# Patient Record
Sex: FEMALE | Race: BLACK OR AFRICAN AMERICAN | NOT HISPANIC OR LATINO | Employment: FULL TIME | URBAN - METROPOLITAN AREA
[De-identification: names, ages, dates, MRNs, and addresses within clinical notes are randomized per-mention and may not be internally consistent; named-entity substitution may affect disease eponyms.]

---

## 2019-05-21 ENCOUNTER — HOSPITAL ENCOUNTER (EMERGENCY)
Facility: HOSPITAL | Age: 24
Discharge: HOME | End: 2019-05-21
Attending: EMERGENCY MEDICINE
Payer: MEDICAID

## 2019-05-21 VITALS
BODY MASS INDEX: 39.24 KG/M2 | TEMPERATURE: 99 F | RESPIRATION RATE: 18 BRPM | OXYGEN SATURATION: 99 % | SYSTOLIC BLOOD PRESSURE: 126 MMHG | HEIGHT: 67 IN | DIASTOLIC BLOOD PRESSURE: 64 MMHG | WEIGHT: 250 LBS | HEART RATE: 93 BPM

## 2019-05-21 DIAGNOSIS — D50.9 MICROCYTIC ANEMIA: Primary | ICD-10-CM

## 2019-05-21 DIAGNOSIS — R00.0 TACHYCARDIA: ICD-10-CM

## 2019-05-21 LAB
ALBUMIN SERPL BCP-MCNC: 3.8 G/DL (ref 3.5–5.2)
ALP SERPL-CCNC: 93 U/L (ref 55–135)
ALT SERPL W/O P-5'-P-CCNC: 14 U/L (ref 10–44)
ANION GAP SERPL CALC-SCNC: 8 MMOL/L (ref 8–16)
ANISOCYTOSIS BLD QL SMEAR: SLIGHT
AST SERPL-CCNC: 13 U/L (ref 10–40)
B-HCG UR QL: NEGATIVE
BASOPHILS # BLD AUTO: 0.01 K/UL (ref 0–0.2)
BASOPHILS NFR BLD: 0.2 % (ref 0–1.9)
BILIRUB SERPL-MCNC: 0.2 MG/DL (ref 0.1–1)
BILIRUB UR QL STRIP: NEGATIVE
BUN SERPL-MCNC: 8 MG/DL (ref 6–20)
CALCIUM SERPL-MCNC: 9.8 MG/DL (ref 8.7–10.5)
CHLORIDE SERPL-SCNC: 104 MMOL/L (ref 95–110)
CLARITY UR: CLEAR
CO2 SERPL-SCNC: 25 MMOL/L (ref 23–29)
COLOR UR: ABNORMAL
CREAT SERPL-MCNC: 0.7 MG/DL (ref 0.5–1.4)
CTP QC/QA: YES
DIFFERENTIAL METHOD: ABNORMAL
EOSINOPHIL # BLD AUTO: 0 K/UL (ref 0–0.5)
EOSINOPHIL NFR BLD: 0.6 % (ref 0–8)
ERYTHROCYTE [DISTWIDTH] IN BLOOD BY AUTOMATED COUNT: 18 % (ref 11.5–14.5)
EST. GFR  (AFRICAN AMERICAN): >60 ML/MIN/1.73 M^2
EST. GFR  (NON AFRICAN AMERICAN): >60 ML/MIN/1.73 M^2
GLUCOSE SERPL-MCNC: 92 MG/DL (ref 70–110)
GLUCOSE UR QL STRIP: NEGATIVE
HCT VFR BLD AUTO: 30.4 % (ref 37–48.5)
HGB BLD-MCNC: 9.3 G/DL (ref 12–16)
HGB UR QL STRIP: ABNORMAL
HYPOCHROMIA BLD QL SMEAR: ABNORMAL
KETONES UR QL STRIP: NEGATIVE
LEUKOCYTE ESTERASE UR QL STRIP: NEGATIVE
LIPASE SERPL-CCNC: 10 U/L (ref 4–60)
LYMPHOCYTES # BLD AUTO: 1.4 K/UL (ref 1–4.8)
LYMPHOCYTES NFR BLD: 21.3 % (ref 18–48)
MCH RBC QN AUTO: 21.3 PG (ref 27–31)
MCHC RBC AUTO-ENTMCNC: 30.6 G/DL (ref 32–36)
MCV RBC AUTO: 70 FL (ref 82–98)
MICROSCOPIC COMMENT: NORMAL
MONOCYTES # BLD AUTO: 0.4 K/UL (ref 0.3–1)
MONOCYTES NFR BLD: 6.2 % (ref 4–15)
NEUTROPHILS # BLD AUTO: 4.8 K/UL (ref 1.8–7.7)
NEUTROPHILS NFR BLD: 71.9 % (ref 38–73)
NITRITE UR QL STRIP: NEGATIVE
OVALOCYTES BLD QL SMEAR: ABNORMAL
PH UR STRIP: 6 [PH] (ref 5–8)
PLATELET # BLD AUTO: 456 K/UL (ref 150–350)
PMV BLD AUTO: 9.7 FL (ref 9.2–12.9)
POIKILOCYTOSIS BLD QL SMEAR: SLIGHT
POTASSIUM SERPL-SCNC: 3.8 MMOL/L (ref 3.5–5.1)
PROT SERPL-MCNC: 8.1 G/DL (ref 6–8.4)
PROT UR QL STRIP: NEGATIVE
RBC # BLD AUTO: 4.37 M/UL (ref 4–5.4)
RBC #/AREA URNS HPF: 1 /HPF (ref 0–4)
SODIUM SERPL-SCNC: 137 MMOL/L (ref 136–145)
SP GR UR STRIP: 1.01 (ref 1–1.03)
SQUAMOUS #/AREA URNS HPF: 3 /HPF
TARGETS BLD QL SMEAR: ABNORMAL
TSH SERPL DL<=0.005 MIU/L-ACNC: 0.71 UIU/ML (ref 0.4–4)
URN SPEC COLLECT METH UR: ABNORMAL
UROBILINOGEN UR STRIP-ACNC: NEGATIVE EU/DL
WBC # BLD AUTO: 6.63 K/UL (ref 3.9–12.7)
WBC #/AREA URNS HPF: 2 /HPF (ref 0–5)

## 2019-05-21 PROCEDURE — 93010 ELECTROCARDIOGRAM REPORT: CPT | Mod: ,,, | Performed by: INTERNAL MEDICINE

## 2019-05-21 PROCEDURE — 93010 EKG 12-LEAD: ICD-10-PCS | Mod: ,,, | Performed by: INTERNAL MEDICINE

## 2019-05-21 PROCEDURE — 85025 COMPLETE CBC W/AUTO DIFF WBC: CPT

## 2019-05-21 PROCEDURE — 25000003 PHARM REV CODE 250: Performed by: PHYSICIAN ASSISTANT

## 2019-05-21 PROCEDURE — 83690 ASSAY OF LIPASE: CPT

## 2019-05-21 PROCEDURE — 96374 THER/PROPH/DIAG INJ IV PUSH: CPT

## 2019-05-21 PROCEDURE — 80053 COMPREHEN METABOLIC PANEL: CPT

## 2019-05-21 PROCEDURE — 81000 URINALYSIS NONAUTO W/SCOPE: CPT

## 2019-05-21 PROCEDURE — 84443 ASSAY THYROID STIM HORMONE: CPT

## 2019-05-21 PROCEDURE — 96361 HYDRATE IV INFUSION ADD-ON: CPT

## 2019-05-21 PROCEDURE — 99284 EMERGENCY DEPT VISIT MOD MDM: CPT | Mod: 25

## 2019-05-21 PROCEDURE — 93005 ELECTROCARDIOGRAM TRACING: CPT

## 2019-05-21 PROCEDURE — 81025 URINE PREGNANCY TEST: CPT | Performed by: PHYSICIAN ASSISTANT

## 2019-05-21 PROCEDURE — 63600175 PHARM REV CODE 636 W HCPCS: Performed by: PHYSICIAN ASSISTANT

## 2019-05-21 RX ORDER — ONDANSETRON 2 MG/ML
4 INJECTION INTRAMUSCULAR; INTRAVENOUS
Status: COMPLETED | OUTPATIENT
Start: 2019-05-21 | End: 2019-05-21

## 2019-05-21 RX ORDER — ONDANSETRON 4 MG/1
4 TABLET, FILM COATED ORAL EVERY 8 HOURS PRN
Qty: 12 TABLET | Refills: 0 | Status: ON HOLD | OUTPATIENT
Start: 2019-05-21 | End: 2020-02-14 | Stop reason: HOSPADM

## 2019-05-21 RX ADMIN — ONDANSETRON 4 MG: 2 INJECTION INTRAMUSCULAR; INTRAVENOUS at 01:05

## 2019-05-21 RX ADMIN — SODIUM CHLORIDE 1000 ML: 0.9 INJECTION, SOLUTION INTRAVENOUS at 01:05

## 2019-05-21 NOTE — ED TRIAGE NOTES
Arrived via personal transportation. Pt reports light headiness, nausea, and fatigue x 1 week. Denies vomiting.

## 2019-05-21 NOTE — ED PROVIDER NOTES
Encounter Date: 5/21/2019    SCRIBE #1 NOTE: I, Basia Hillman, am scribing for, and in the presence of,  Williamericka Estes. I have scribed the entire note.       History     Chief Complaint   Patient presents with    Fatigue     states has been feeling nauseous , lightheaded and tired x 1 week. Denies pain     CC: Lightheadedness    HPI:  This is a 23 y.o. female smoker (1 cigarette per day) with a PMHx of HTN, abdominal ulcer, and acid reflux who presents to the Emergency Department with a cc of lightheadedness x 1 week. The lightheadedness is described as a feeling of being off-balance that comes and goes. Associated symptoms include headache, nausea, and vomiting. She denies vision changes, abdominal pain, diarrhea, hematuria, dysuria, frequency, vaginal discharge, vaginal bleeding, fever, chills, cough, constipation, rhinorrhea, sore throat, chest pain, shortness of breath, or appetite change. Her symptoms are more severe in the morning. Symptoms are worsened by lying supine and without alleviating factors. She reports no prior history of similar symptoms. NKDA. She has not seen an eye doctor in 3-4 years. LMP began 4/17/2019 ended 4 days earlier than usual. THC occasionally, but no recent use.        The history is provided by the patient.     Review of patient's allergies indicates:  No Known Allergies  Past Medical History:   Diagnosis Date    ADD (attention deficit disorder)     Hypertension      Past Surgical History:   Procedure Laterality Date    BREAST SURGERY      left (biopsy)    EXCISION-CYST-BREAST Left 7/30/2014    Performed by Anival Yao MD at [facility] OR     History reviewed. No pertinent family history.  Social History     Tobacco Use    Smoking status: Current Every Day Smoker   Substance Use Topics    Alcohol use: Yes     Comment: occasionally    Drug use: No     Review of Systems   Constitutional: Negative for appetite change, chills and fever.   HENT: Negative for rhinorrhea and sore  throat.    Eyes: Negative for visual disturbance.   Respiratory: Negative for cough and shortness of breath.    Cardiovascular: Negative for chest pain.   Gastrointestinal: Positive for nausea and vomiting. Negative for abdominal pain, constipation and diarrhea.   Genitourinary: Negative for dysuria, frequency, hematuria, vaginal bleeding and vaginal discharge.   Musculoskeletal: Negative for back pain.   Skin: Negative for rash.   Neurological: Positive for light-headedness and headaches. Negative for weakness.   Hematological: Does not bruise/bleed easily.   All other systems reviewed and are negative.      Physical Exam     Initial Vitals [05/21/19 1202]   BP Pulse Resp Temp SpO2   (!) 163/99 (!) 112 16 98.6 °F (37 °C) 99 %      MAP       --         Physical Exam    Nursing note and vitals reviewed.  Constitutional: She appears well-developed and well-nourished. No distress.   HENT:   Head: Normocephalic and atraumatic.   Eyes: EOM are normal. Pupils are equal, round, and reactive to light.   Neck: Normal range of motion. Neck supple.   Cardiovascular: Normal rate, regular rhythm and normal heart sounds. Exam reveals no gallop and no friction rub.    No murmur heard.  Pulmonary/Chest: Breath sounds normal. No respiratory distress. She has no wheezes. She has no rhonchi. She has no rales.   Abdominal: Soft. Bowel sounds are normal. There is no tenderness. There is no rebound and no guarding.   Musculoskeletal: Normal range of motion.   Neurological: She is alert and oriented to person, place, and time. She has normal strength. No cranial nerve deficit or sensory deficit. She displays a negative Romberg sign. Coordination and gait normal. GCS eye subscore is 4. GCS verbal subscore is 5. GCS motor subscore is 6.   Normal finger-nose.  Normal rapid alternating movements.  Normal heel-to-shin.   Skin: Skin is warm and dry.   Psychiatric: She has a normal mood and affect.         ED Course   Procedures  Labs Reviewed    CBC W/ AUTO DIFFERENTIAL - Abnormal; Notable for the following components:       Result Value    Hemoglobin 9.3 (*)     Hematocrit 30.4 (*)     Mean Corpuscular Volume 70 (*)     Mean Corpuscular Hemoglobin 21.3 (*)     Mean Corpuscular Hemoglobin Conc 30.6 (*)     RDW 18.0 (*)     Platelets 456 (*)     All other components within normal limits   URINALYSIS, REFLEX TO URINE CULTURE - Abnormal; Notable for the following components:    Occult Blood UA 3+ (*)     All other components within normal limits    Narrative:     Preferred Collection Type->Urine, Clean Catch   COMPREHENSIVE METABOLIC PANEL   LIPASE   TSH   URINALYSIS MICROSCOPIC    Narrative:     Preferred Collection Type->Urine, Clean Catch   POCT URINE PREGNANCY     EKG Readings: (Independently Interpreted)   Rhythm: Sinus Tachycardia. Heart Rate: 105. ST Segments: Normal ST Segments. T Waves: Normal.       Imaging Results    None          Medical Decision Making:   Differential Diagnosis:   Differential diagnosis includes but is not limited to:  Dehydration, anemia, cardiac arrhythmia, hypothyroidism, electrolyte derangement, vertigo, pregnancy, UTI  ED Management:  This is an evaluation of a 23 y.o. female who presents to the ED for lightheadedness and nausea.  Patient is tachycardic.  Vital signs otherwise stable. Afebrile.  Patient is nontoxic appearing and in no acute distress. Patient is urologically intact. No gross neurological deficits.  Patient is tachycardic but heart sounds otherwise normal.  Lungs are clear to auscultation. Posterior oropharynx is clear.  Extraocular moves are intact.  No nystagmus.  Bilateral TMs are clear.  Abdomen is soft and nontender palpation.    UPT negative. CBC shows a microcytic anemia but H&H is stable. No leukocytosis.  CMP shows no gross electrolyte abnormalities.  Lipase within limits.  TSH within limits.  UA shows no signs of infection.  EKG shows sinus tachycardia with a rate of 105 with no ST segment changes.   Patient treated the ED with IV fluids and Zofran with improvement of symptoms. Given patient's recent menstrual cycle, I suspect etiology of patient's symptoms as well as microcytic anemia likely secondary to heavy menstruation.  Will encourage patient follow up with OBGYN.  Heart rate improved to 93 after treatment emergency department.    Patient given return precautions and instructed to return to the emergency department for any new or worsening symptoms. Patient verbalized understanding and agreed with plan.     I discussed the case with Dr. Iyer  who is in agreement with my assessment and plan.                        Clinical Impression:     1. Microcytic anemia    2. Tachycardia               Scribe attestation: I, William Estes PA-C, personally performed the services described in this documentation. All medical record entries made by the scribe were at my direction and in my presence.  I have reviewed the chart and agree that the record reflects my personal performance and is accurate and complete.                      William Estes PA-C  05/21/19 [id number]       William Estes PA-C  05/21/19 [id number]

## 2020-02-06 ENCOUNTER — HOSPITAL ENCOUNTER (EMERGENCY)
Facility: HOSPITAL | Age: 25
Discharge: PSYCHIATRIC OR SUBSTANCE USE FACILITY | End: 2020-02-06
Attending: EMERGENCY MEDICINE
Payer: MEDICAID

## 2020-02-06 VITALS
OXYGEN SATURATION: 100 % | HEART RATE: 110 BPM | DIASTOLIC BLOOD PRESSURE: 87 MMHG | TEMPERATURE: 98 F | BODY MASS INDEX: 39.24 KG/M2 | RESPIRATION RATE: 19 BRPM | SYSTOLIC BLOOD PRESSURE: 129 MMHG | HEIGHT: 67 IN | WEIGHT: 250 LBS

## 2020-02-06 DIAGNOSIS — R45.851 SUICIDAL IDEATION: ICD-10-CM

## 2020-02-06 DIAGNOSIS — F32.A DEPRESSION WITH SUICIDAL IDEATION: Primary | ICD-10-CM

## 2020-02-06 DIAGNOSIS — Z00.8 MEDICAL CLEARANCE FOR PSYCHIATRIC ADMISSION: ICD-10-CM

## 2020-02-06 DIAGNOSIS — R45.851 DEPRESSION WITH SUICIDAL IDEATION: Primary | ICD-10-CM

## 2020-02-06 DIAGNOSIS — R44.0 AUDITORY HALLUCINATIONS: ICD-10-CM

## 2020-02-06 PROBLEM — Z13.9 ENCOUNTER FOR MEDICAL SCREENING EXAMINATION: Status: ACTIVE | Noted: 2020-02-06

## 2020-02-06 PROBLEM — D50.9 IRON DEFICIENCY ANEMIA: Status: ACTIVE | Noted: 2020-02-06

## 2020-02-06 PROBLEM — I10 HTN (HYPERTENSION): Status: ACTIVE | Noted: 2020-02-06

## 2020-02-06 LAB
ALBUMIN SERPL BCP-MCNC: 3.9 G/DL (ref 3.5–5.2)
ALP SERPL-CCNC: 102 U/L (ref 55–135)
ALT SERPL W/O P-5'-P-CCNC: 39 U/L (ref 10–44)
AMPHET+METHAMPHET UR QL: NEGATIVE
ANION GAP SERPL CALC-SCNC: 11 MMOL/L (ref 8–16)
APAP SERPL-MCNC: <3 UG/ML (ref 10–20)
AST SERPL-CCNC: 27 U/L (ref 10–40)
B-HCG UR QL: NEGATIVE
BACTERIA #/AREA URNS HPF: ABNORMAL /HPF
BARBITURATES UR QL SCN>200 NG/ML: NEGATIVE
BASOPHILS # BLD AUTO: 0.02 K/UL (ref 0–0.2)
BASOPHILS NFR BLD: 0.3 % (ref 0–1.9)
BENZODIAZ UR QL SCN>200 NG/ML: NEGATIVE
BILIRUB SERPL-MCNC: 0.1 MG/DL (ref 0.1–1)
BILIRUB UR QL STRIP: NEGATIVE
BUN SERPL-MCNC: 12 MG/DL (ref 6–20)
BZE UR QL SCN: NEGATIVE
CALCIUM SERPL-MCNC: 9.6 MG/DL (ref 8.7–10.5)
CANNABINOIDS UR QL SCN: NEGATIVE
CHLORIDE SERPL-SCNC: 109 MMOL/L (ref 95–110)
CLARITY UR: CLEAR
CO2 SERPL-SCNC: 20 MMOL/L (ref 23–29)
COLOR UR: YELLOW
CREAT SERPL-MCNC: 0.8 MG/DL (ref 0.5–1.4)
CREAT UR-MCNC: 147.7 MG/DL (ref 15–325)
CTP QC/QA: YES
DIFFERENTIAL METHOD: ABNORMAL
EOSINOPHIL # BLD AUTO: 0.2 K/UL (ref 0–0.5)
EOSINOPHIL NFR BLD: 2 % (ref 0–8)
ERYTHROCYTE [DISTWIDTH] IN BLOOD BY AUTOMATED COUNT: 19.2 % (ref 11.5–14.5)
EST. GFR  (AFRICAN AMERICAN): >60 ML/MIN/1.73 M^2
EST. GFR  (NON AFRICAN AMERICAN): >60 ML/MIN/1.73 M^2
ETHANOL SERPL-MCNC: 88 MG/DL
GLUCOSE SERPL-MCNC: 100 MG/DL (ref 70–110)
GLUCOSE UR QL STRIP: NEGATIVE
HCT VFR BLD AUTO: 35.2 % (ref 37–48.5)
HGB BLD-MCNC: 10.5 G/DL (ref 12–16)
HGB UR QL STRIP: NEGATIVE
HYALINE CASTS #/AREA URNS LPF: 0 /LPF
IMM GRANULOCYTES # BLD AUTO: 0.01 K/UL (ref 0–0.04)
IMM GRANULOCYTES NFR BLD AUTO: 0.1 % (ref 0–0.5)
KETONES UR QL STRIP: NEGATIVE
LEUKOCYTE ESTERASE UR QL STRIP: NEGATIVE
LYMPHOCYTES # BLD AUTO: 1.8 K/UL (ref 1–4.8)
LYMPHOCYTES NFR BLD: 23.4 % (ref 18–48)
MCH RBC QN AUTO: 21.7 PG (ref 27–31)
MCHC RBC AUTO-ENTMCNC: 29.8 G/DL (ref 32–36)
MCV RBC AUTO: 73 FL (ref 82–98)
METHADONE UR QL SCN>300 NG/ML: NEGATIVE
MICROSCOPIC COMMENT: ABNORMAL
MONOCYTES # BLD AUTO: 0.5 K/UL (ref 0.3–1)
MONOCYTES NFR BLD: 6.4 % (ref 4–15)
NEUTROPHILS # BLD AUTO: 5.2 K/UL (ref 1.8–7.7)
NEUTROPHILS NFR BLD: 67.8 % (ref 38–73)
NITRITE UR QL STRIP: NEGATIVE
NRBC BLD-RTO: 0 /100 WBC
OPIATES UR QL SCN: NEGATIVE
PCP UR QL SCN>25 NG/ML: NEGATIVE
PH UR STRIP: 6 [PH] (ref 5–8)
PLATELET # BLD AUTO: 449 K/UL (ref 150–350)
PMV BLD AUTO: 10.4 FL (ref 9.2–12.9)
POTASSIUM SERPL-SCNC: 4.2 MMOL/L (ref 3.5–5.1)
PROT SERPL-MCNC: 7.6 G/DL (ref 6–8.4)
PROT UR QL STRIP: ABNORMAL
RBC # BLD AUTO: 4.83 M/UL (ref 4–5.4)
RBC #/AREA URNS HPF: 2 /HPF (ref 0–4)
SALICYLATES SERPL-MCNC: <5 MG/DL (ref 15–30)
SODIUM SERPL-SCNC: 140 MMOL/L (ref 136–145)
SP GR UR STRIP: 1.02 (ref 1–1.03)
TOXICOLOGY INFORMATION: NORMAL
TSH SERPL DL<=0.005 MIU/L-ACNC: 0.76 UIU/ML (ref 0.4–4)
URN SPEC COLLECT METH UR: ABNORMAL
UROBILINOGEN UR STRIP-ACNC: NEGATIVE EU/DL
WBC # BLD AUTO: 7.66 K/UL (ref 3.9–12.7)
WBC #/AREA URNS HPF: 8 /HPF (ref 0–5)

## 2020-02-06 PROCEDURE — 81000 URINALYSIS NONAUTO W/SCOPE: CPT | Mod: 59

## 2020-02-06 PROCEDURE — 80329 ANALGESICS NON-OPIOID 1 OR 2: CPT

## 2020-02-06 PROCEDURE — 81025 URINE PREGNANCY TEST: CPT | Performed by: EMERGENCY MEDICINE

## 2020-02-06 PROCEDURE — 85025 COMPLETE CBC W/AUTO DIFF WBC: CPT

## 2020-02-06 PROCEDURE — 80053 COMPREHEN METABOLIC PANEL: CPT

## 2020-02-06 PROCEDURE — 99285 EMERGENCY DEPT VISIT HI MDM: CPT | Mod: 25

## 2020-02-06 PROCEDURE — 84443 ASSAY THYROID STIM HORMONE: CPT

## 2020-02-06 PROCEDURE — 80307 DRUG TEST PRSMV CHEM ANLYZR: CPT

## 2020-02-06 PROCEDURE — 93010 ELECTROCARDIOGRAM REPORT: CPT | Mod: ,,, | Performed by: INTERNAL MEDICINE

## 2020-02-06 PROCEDURE — 93005 ELECTROCARDIOGRAM TRACING: CPT

## 2020-02-06 PROCEDURE — 80320 DRUG SCREEN QUANTALCOHOLS: CPT

## 2020-02-06 PROCEDURE — 25000003 PHARM REV CODE 250: Performed by: EMERGENCY MEDICINE

## 2020-02-06 PROCEDURE — 93010 EKG 12-LEAD: ICD-10-PCS | Mod: ,,, | Performed by: INTERNAL MEDICINE

## 2020-02-06 RX ORDER — LORAZEPAM 0.5 MG/1
2 TABLET ORAL
Status: COMPLETED | OUTPATIENT
Start: 2020-02-06 | End: 2020-02-06

## 2020-02-06 RX ADMIN — LORAZEPAM 2 MG: 0.5 TABLET ORAL at 03:02

## 2020-02-06 NOTE — ED TRIAGE NOTES
Patient reports having suicidal thoughts her entire adult life. Most recent episode started over a month ago.  Patient reports coping with issues by drinking.  Patient reports hearing voices, and having anxiety.

## 2020-05-11 PROBLEM — Z13.9 ENCOUNTER FOR MEDICAL SCREENING EXAMINATION: Status: RESOLVED | Noted: 2020-02-06 | Resolved: 2020-05-11

## 2020-10-19 ENCOUNTER — HOSPITAL ENCOUNTER (EMERGENCY)
Facility: HOSPITAL | Age: 25
Discharge: HOME | End: 2020-10-19
Attending: EMERGENCY MEDICINE

## 2020-10-19 VITALS
HEART RATE: 95 BPM | RESPIRATION RATE: 18 BRPM | SYSTOLIC BLOOD PRESSURE: 145 MMHG | OXYGEN SATURATION: 100 % | BODY MASS INDEX: 43.95 KG/M2 | TEMPERATURE: 99 F | HEIGHT: 67 IN | DIASTOLIC BLOOD PRESSURE: 90 MMHG | WEIGHT: 280 LBS

## 2020-10-19 DIAGNOSIS — R11.2 NON-INTRACTABLE VOMITING WITH NAUSEA, UNSPECIFIED VOMITING TYPE: Primary | ICD-10-CM

## 2020-10-19 LAB
ALBUMIN SERPL BCP-MCNC: 3.3 G/DL (ref 3.5–5.2)
ALP SERPL-CCNC: 84 U/L (ref 55–135)
ALT SERPL W/O P-5'-P-CCNC: 24 U/L (ref 10–44)
ANION GAP SERPL CALC-SCNC: 10 MMOL/L (ref 8–16)
AST SERPL-CCNC: 17 U/L (ref 10–40)
B-HCG UR QL: NEGATIVE
BASOPHILS # BLD AUTO: 0.04 K/UL (ref 0–0.2)
BASOPHILS NFR BLD: 0.5 % (ref 0–1.9)
BILIRUB SERPL-MCNC: 0.1 MG/DL (ref 0.1–1)
BILIRUB UR QL STRIP: NEGATIVE
BUN SERPL-MCNC: 11 MG/DL (ref 6–20)
CALCIUM SERPL-MCNC: 9.1 MG/DL (ref 8.7–10.5)
CHLORIDE SERPL-SCNC: 105 MMOL/L (ref 95–110)
CLARITY UR: CLEAR
CO2 SERPL-SCNC: 25 MMOL/L (ref 23–29)
COLOR UR: YELLOW
CREAT SERPL-MCNC: 0.8 MG/DL (ref 0.5–1.4)
CTP QC/QA: YES
DIFFERENTIAL METHOD: ABNORMAL
EOSINOPHIL # BLD AUTO: 0.2 K/UL (ref 0–0.5)
EOSINOPHIL NFR BLD: 2.1 % (ref 0–8)
ERYTHROCYTE [DISTWIDTH] IN BLOOD BY AUTOMATED COUNT: 17.8 % (ref 11.5–14.5)
EST. GFR  (AFRICAN AMERICAN): >60 ML/MIN/1.73 M^2
EST. GFR  (NON AFRICAN AMERICAN): >60 ML/MIN/1.73 M^2
GLUCOSE SERPL-MCNC: 109 MG/DL (ref 70–110)
GLUCOSE UR QL STRIP: NEGATIVE
HCT VFR BLD AUTO: 31.3 % (ref 37–48.5)
HGB BLD-MCNC: 9.3 G/DL (ref 12–16)
HGB UR QL STRIP: NEGATIVE
IMM GRANULOCYTES # BLD AUTO: 0.02 K/UL (ref 0–0.04)
IMM GRANULOCYTES NFR BLD AUTO: 0.2 % (ref 0–0.5)
KETONES UR QL STRIP: NEGATIVE
LEUKOCYTE ESTERASE UR QL STRIP: NEGATIVE
LIPASE SERPL-CCNC: 26 U/L (ref 4–60)
LYMPHOCYTES # BLD AUTO: 2.3 K/UL (ref 1–4.8)
LYMPHOCYTES NFR BLD: 27 % (ref 18–48)
MCH RBC QN AUTO: 21.3 PG (ref 27–31)
MCHC RBC AUTO-ENTMCNC: 29.7 G/DL (ref 32–36)
MCV RBC AUTO: 72 FL (ref 82–98)
MONOCYTES # BLD AUTO: 0.6 K/UL (ref 0.3–1)
MONOCYTES NFR BLD: 6.9 % (ref 4–15)
NEUTROPHILS # BLD AUTO: 5.3 K/UL (ref 1.8–7.7)
NEUTROPHILS NFR BLD: 63.3 % (ref 38–73)
NITRITE UR QL STRIP: NEGATIVE
NRBC BLD-RTO: 0 /100 WBC
PH UR STRIP: 6 [PH] (ref 5–8)
PLATELET # BLD AUTO: 445 K/UL (ref 150–350)
PMV BLD AUTO: 10.3 FL (ref 9.2–12.9)
POTASSIUM SERPL-SCNC: 3.6 MMOL/L (ref 3.5–5.1)
PROT SERPL-MCNC: 7.4 G/DL (ref 6–8.4)
PROT UR QL STRIP: NEGATIVE
RBC # BLD AUTO: 4.37 M/UL (ref 4–5.4)
SODIUM SERPL-SCNC: 140 MMOL/L (ref 136–145)
SP GR UR STRIP: 1.02 (ref 1–1.03)
URN SPEC COLLECT METH UR: NORMAL
UROBILINOGEN UR STRIP-ACNC: NEGATIVE EU/DL
WBC # BLD AUTO: 8.44 K/UL (ref 3.9–12.7)

## 2020-10-19 PROCEDURE — 83690 ASSAY OF LIPASE: CPT

## 2020-10-19 PROCEDURE — 96361 HYDRATE IV INFUSION ADD-ON: CPT

## 2020-10-19 PROCEDURE — 81025 URINE PREGNANCY TEST: CPT | Performed by: EMERGENCY MEDICINE

## 2020-10-19 PROCEDURE — 80053 COMPREHEN METABOLIC PANEL: CPT

## 2020-10-19 PROCEDURE — 25000003 PHARM REV CODE 250: Performed by: PHYSICIAN ASSISTANT

## 2020-10-19 PROCEDURE — 85025 COMPLETE CBC W/AUTO DIFF WBC: CPT

## 2020-10-19 PROCEDURE — 96374 THER/PROPH/DIAG INJ IV PUSH: CPT

## 2020-10-19 PROCEDURE — 63600175 PHARM REV CODE 636 W HCPCS: Performed by: PHYSICIAN ASSISTANT

## 2020-10-19 PROCEDURE — 99284 EMERGENCY DEPT VISIT MOD MDM: CPT | Mod: 25

## 2020-10-19 PROCEDURE — 81003 URINALYSIS AUTO W/O SCOPE: CPT

## 2020-10-19 RX ORDER — ONDANSETRON 2 MG/ML
4 INJECTION INTRAMUSCULAR; INTRAVENOUS
Status: COMPLETED | OUTPATIENT
Start: 2020-10-19 | End: 2020-10-19

## 2020-10-19 RX ORDER — PANTOPRAZOLE SODIUM 20 MG/1
20 TABLET, DELAYED RELEASE ORAL DAILY
Qty: 30 TABLET | Refills: 0 | Status: SHIPPED | OUTPATIENT
Start: 2020-10-19 | End: 2021-10-19

## 2020-10-19 RX ORDER — ONDANSETRON 4 MG/1
4 TABLET, ORALLY DISINTEGRATING ORAL EVERY 6 HOURS PRN
Qty: 20 TABLET | Refills: 0 | Status: SHIPPED | OUTPATIENT
Start: 2020-10-19

## 2020-10-19 RX ADMIN — SODIUM CHLORIDE 1000 ML: 0.9 INJECTION, SOLUTION INTRAVENOUS at 09:10

## 2020-10-19 RX ADMIN — ONDANSETRON 4 MG: 2 INJECTION INTRAMUSCULAR; INTRAVENOUS at 09:10

## 2020-10-19 NOTE — Clinical Note
"Yvonne Fermin" Dennis was seen and treated in our emergency department on 10/19/2020.  She may return to work on 10/21/2020.       If you have any questions or concerns, please don't hesitate to call.      Eduin Raygoza PA-C"

## 2020-10-20 NOTE — FIRST PROVIDER EVALUATION
Emergency Department TeleTriage Encounter Note      CHIEF COMPLAINT    Chief Complaint   Patient presents with    Nausea     w/ dizziness x 2 months, has had this before       VITAL SIGNS   Initial Vitals [10/19/20 2003]   BP Pulse Resp Temp SpO2   (!) 144/92 (!) 119 18 98.4 °F (36.9 °C) 98 %      MAP       --            ALLERGIES    Review of patient's allergies indicates:  No Known Allergies    PROVIDER TRIAGE NOTE  This is a teletriage evaluation of a 24 y.o. female presenting to the ED with c/o regular nausea and vomiting with associated dizziness frequently over the last 2 months.  She also reports headache today.  Denies abdominal pain.  UPT negative. Initial orders will be placed and care will be transferred to an alternate provider when patient is roomed for a full evaluation. Any additional orders and the final disposition will be determined by that provider.         ORDERS  Labs Reviewed   CBC W/ AUTO DIFFERENTIAL   COMPREHENSIVE METABOLIC PANEL   LIPASE   URINALYSIS, REFLEX TO URINE CULTURE   POCT URINE PREGNANCY       ED Orders (720h ago, onward)    Start Ordered     Status Ordering Provider    10/19/20 2045 10/19/20 2040  sodium chloride 0.9% bolus 1,000 mL  ED 1 Time      Ordered OJ SERRANO    10/19/20 2045 10/19/20 2040  ondansetron injection 4 mg  ED 1 Time      Ordered OJ SERRANO    10/19/20 2041 10/19/20 2040  Lipase  STAT  Collect    Ordered OJ SERRANO    10/19/20 2041 10/19/20 2040  Urinalysis, Reflex to Urine Culture Urine, Clean Catch  STAT      Ordered OJ SERRANO    10/19/20 2040 10/19/20 2040  Insert peripheral IV  Continuous      Ordered OJ SERRANO    10/19/20 2040 10/19/20 2040  CBC auto differential  STAT  Collect    Ordered OJ SERRANO    10/19/20 2040 10/19/20 2040  Comprehensive metabolic panel  STAT  Collect    Ordered OJ SERRANO    10/19/20 2006 10/19/20 2005  POCT urine pregnancy  Once  Completed    Final result ARNEL PRESLEY            Virtual Visit  Note: The provider triage portion of this emergency department evaluation and documentation was performed via [employer], a HIPAA-compliant telemedicine application, in concert with a tele-presenter in the room. A face to face patient evaluation with one of my colleagues will occur once the patient is placed in an emergency department room.      DISCLAIMER: This note was prepared with [employer] voice recognition transcription software. Garbled syntax, mangled pronouns, and other bizarre constructions may be attributed to that software system.

## 2020-10-20 NOTE — ED PROVIDER NOTES
Encounter Date: 10/19/2020       History     Chief Complaint   Patient presents with    Nausea     w/ dizziness x 2 months, has had this before     24-year-old female, history of costochondritis, dysphagia, epistaxis, dizziness, hypertension, ADHD, subjective history of PUD, anemia, presents to ED complaining of 2 month history of nausea, emesis, lightheadedness.     Patient states she has a history of peptic ulcer disease; states she was diagnosed due to nausea vomiting particularly in the mornings.  She states she was treated many years ago, denies history of H pylori.  She states over the past 2 months she has nauseous when she wakes up, sometime she throws up.  Denies hematemesis, no bilious emesis.  No history of gallbladder issues.  No history of any abdominal surgeries.  She denies any urinary complaints or flank pain.  She does state that she has had loose stools 2-3 times daily over the past 2 months, sometimes with mucoid stool.  No hematochezia or melena.  Denies NSAID use.  Denies history of GI bleed. Lightheadedness intermittent x 2 months. No exertional lightheadedness, typically occurs in AM with severe nausea. No syncope or near syncope. No headache. No arrhythmia. No CP or SOB.    She admits to normal appetite intake, states nausea improves throughout the day.  No fever or chills or body aches.  No recent illness or sick contacts.  No abdominal pain.  No pelvic pain.  No vaginal complaints.        Review of patient's allergies indicates:  No Known Allergies  Past Medical History:   Diagnosis Date    ADD (attention deficit disorder)     Hypertension      Past Surgical History:   Procedure Laterality Date    BREAST SURGERY      left (biopsy)     History reviewed. No pertinent family history.  Social History     Tobacco Use    Smoking status: Current Every Day Smoker     Packs/day: 0.25     Types: Cigarettes   Substance Use Topics    Alcohol use: Yes     Comment: occasionally    Drug use: No      Review of Systems   Constitutional: Positive for appetite change. Negative for chills, fatigue and fever.   Respiratory: Negative for shortness of breath.    Cardiovascular: Negative for chest pain.   Gastrointestinal: Positive for diarrhea, nausea and vomiting. Negative for abdominal distention, abdominal pain, blood in stool and constipation.   Genitourinary: Negative for dysuria, flank pain and frequency.   Musculoskeletal: Negative for back pain, myalgias, neck pain and neck stiffness.   Skin: Negative for rash.   Neurological: Negative for weakness and light-headedness.       Physical Exam     Initial Vitals [10/19/20 2003]   BP Pulse Resp Temp SpO2   (!) 144/92 (!) 119 18 98.4 °F (36.9 °C) 98 %      MAP       --         Physical Exam    Nursing note and vitals reviewed.  Constitutional: She appears well-developed and well-nourished. She is not diaphoretic. No distress.   HENT:   Head: Normocephalic and atraumatic.   Mouth/Throat: Oropharynx is clear and moist.   Eyes: EOM are normal.   Neck: Neck supple.   Cardiovascular: Intact distal pulses.   Pulmonary/Chest: No respiratory distress.   Abdominal: Soft. Bowel sounds are normal. She exhibits no distension. There is no abdominal tenderness.   Musculoskeletal: Normal range of motion.   Neurological: She is alert and oriented to person, place, and time.   Skin: Skin is warm.   Psychiatric: She has a normal mood and affect. Thought content normal.         ED Course   Procedures  Labs Reviewed   CBC W/ AUTO DIFFERENTIAL - Abnormal; Notable for the following components:       Result Value    Hemoglobin 9.3 (*)     Hematocrit 31.3 (*)     Mean Corpuscular Volume 72 (*)     Mean Corpuscular Hemoglobin 21.3 (*)     Mean Corpuscular Hemoglobin Conc 29.7 (*)     RDW 17.8 (*)     Platelets 445 (*)     All other components within normal limits   COMPREHENSIVE METABOLIC PANEL - Abnormal; Notable for the following components:    Albumin 3.3 (*)     All other components  within normal limits   LIPASE   URINALYSIS, REFLEX TO URINE CULTURE    Narrative:     Specimen Source->Urine   POCT URINE PREGNANCY          Imaging Results    None          Medical Decision Making:   Initial Assessment:   22-year-old female with 2 month history of nausea in the mornings, sometimes with emesis, also with loose stools over the past 2 months.  No abdominal pain.  No fever.  No change in appetite or intake.  No bloody stools.  Differential Diagnosis:   Inflammatory bowel disease, enteritis, colitis, H pylori, peptic ulcer disease, GERD, gastritis  Clinical Tests:   Lab Tests: Ordered and Reviewed  ED Management:  Lab work unremarkable.  H&H similar previous.  History of anemia.  No complaints concerning for active bleeding at this time.     Symptoms are persistent x2 months.  Vitals similar to previous visits.  Exam overall unremarkable. Young and otherwise healthy. Low suspicion for emergent process. No peritoneal signs. Low suspicion for surgical process.                             Clinical Impression:     ICD-10-CM ICD-9-CM   1. Non-intractable vomiting with nausea, unspecified vomiting type  R11.2 787.01                      Disposition:   Disposition: Discharged  Condition: Stable     ED Disposition Condition    Discharge Stable        ED Prescriptions     Medication Sig Dispense Start Date End Date Auth. Provider    ondansetron (ZOFRAN-ODT) 4 MG TbDL Take 1 tablet (4 mg total) by mouth every 6 (six) hours as needed (nausea). 20 tablet 10/19/2020  Eduin Raygoza PA-C    pantoprazole (PROTONIX) 20 MG tablet Take 1 tablet (20 mg total) by mouth once daily. 30 tablet 10/19/2020 10/19/2021 Eduin Raygoza PA-C        Follow-up Information     Follow up With Specialties Details Why Contact Info    Eleuterio Osorio MD Gastroenterology Schedule an appointment as soon as possible for a visit  For reevaluation [address] [facility] [address]  [phone]      Rigoberto Johnson MD Gastroenterology  Schedule an appointment as soon as possible for a visit  For reevaluation [address] [place] [address]  [address]  [facility]  [place] [place] [address]  [phone]      [facility] Gastroenterology Schedule an appointment as soon as possible for a visit  For reevaluation [address] [facility] [address]  [phone]                                         Eduin Raygoza PA-C  10/20/20 0356

## 2020-10-20 NOTE — ED TRIAGE NOTES
Patient reports nausea, vomiting, diarrhea abdominal pain/cramping upon waking x 2 months, worse today. Denies fever, dysuria, vaginal bleeding, discharge. No meds taken PTA. Also reports frontal headache that started on today. Denies blurred vision, dizziness.

## 2020-10-20 NOTE — DISCHARGE INSTRUCTIONS
Zofran as needed for nausea. Protonix daily.    Follow-up with Gastroenterology for reevaluation. Please return if you are no longer eating or drinking, if uncontrolled vomiting, if worsening abdominal pain, if you begin with black or bloody stools, if any other problems occur.

## 2021-07-13 NOTE — ED PROVIDER NOTES
"Encounter Date: 2/6/2020       History     Chief Complaint   Patient presents with    Suicidal     pt states she is having SI x over a month; hx of SI in the past; denies meds at home; hx of anxiety; pt is anxious in triage and tearful; denies plans and states she drinks a lot; cooperative in triage     23yo female with no relevant PMH presents via personal transportation with depression and SI. Patient reports depression throughout her adolescence, starting when she was a teenager. She has never seen a mental health professional. Patient has done self-harming behaviors in the past; she has cut herself in the past, and recently has banged her head against the wall. Patient also admits to poor sleep and poor eating. She also notes that she hears voices telling her she is worthless. No visual hallucinations.     Patient lives with her boyfriend, whom she has been with since high school. "He doesn't think it's serious." Patient states her boyfriend thinks her behaviors are to gain attention from him.    Patient graduated from high school here. She moved up to [place] for 3 years to live with her ailing grandmother. Patient moved back down here around a year ago. She works at [employer] but has no friends there because "people are mean to me." Patient's friends got into an argument with her tonight.    Patient admits to EtOH tonight and frequently in the setting of her depression.    No meds.     PMH: HTN  Psych: ADD  PSH: left breast cyst excision        Review of patient's allergies indicates:  No Known Allergies  Past Medical History:   Diagnosis Date    ADD (attention deficit disorder)     Hypertension      Past Surgical History:   Procedure Laterality Date    BREAST SURGERY      left (biopsy)     No family history on file.  Social History     Tobacco Use    Smoking status: Current Every Day Smoker   Substance Use Topics    Alcohol use: Yes     Comment: occasionally    Drug use: No     Review of " Systems   Constitutional: Negative for fever.   HENT: Negative for sore throat.    Eyes: Negative for visual disturbance.   Respiratory: Negative for shortness of breath.    Cardiovascular: Negative for chest pain.   Gastrointestinal: Negative for abdominal pain.   Genitourinary: Negative for dysuria.   Musculoskeletal: Negative for neck stiffness.   Skin: Negative for rash.   Neurological: Negative for light-headedness and headaches.   Psychiatric/Behavioral: Positive for dysphoric mood and hallucinations (auditory).       Physical Exam     Initial Vitals [02/06/20 0100]   BP Pulse Resp Temp SpO2   134/80 (!) 138 18 98.8 °F (37.1 °C) 99 %      MAP       --         Physical Exam    Nursing note and vitals reviewed.  Constitutional: She appears well-developed and well-nourished. She is not diaphoretic.   Awake, alert young adult female. Tearful but appropriate.   HENT:   Head: Normocephalic and atraumatic.   Mouth/Throat: Oropharynx is clear and moist.   Eyes: Conjunctivae and EOM are normal. Pupils are equal, round, and reactive to light.   Neck: Normal range of motion. Neck supple.   Cardiovascular: Regular rhythm, normal heart sounds and intact distal pulses.   No murmur heard.  Tachycardic.   Pulmonary/Chest: Breath sounds normal. No respiratory distress. She has no wheezes. She has no rhonchi. She has no rales.   Abdominal: Soft. There is no tenderness.   Musculoskeletal: Normal range of motion. She exhibits no edema or tenderness.   Neurological: She is alert and oriented to person, place, and time. She has normal strength.   Moving all extremities.   Skin: Skin is warm and dry. No erythema. No pallor.   Psychiatric:   Tearful, linear, not responding to internal stimuli.         ED Course   Procedures  Labs Reviewed   CBC W/ AUTO DIFFERENTIAL - Abnormal; Notable for the following components:       Result Value    Hemoglobin 10.5 (*)     Hematocrit 35.2 (*)     Mean Corpuscular Volume 73 (*)     Mean Corpuscular  Hemoglobin 21.7 (*)     Mean Corpuscular Hemoglobin Conc 29.8 (*)     RDW 19.2 (*)     Platelets 449 (*)     All other components within normal limits   COMPREHENSIVE METABOLIC PANEL - Abnormal; Notable for the following components:    CO2 20 (*)     All other components within normal limits   URINALYSIS, REFLEX TO URINE CULTURE - Abnormal; Notable for the following components:    Protein, UA 2+ (*)     All other components within normal limits    Narrative:     Preferred Collection Type->Urine, Clean Catch   ALCOHOL,MEDICAL (ETHANOL) - Abnormal; Notable for the following components:    Alcohol, Medical, Serum 88 (*)     All other components within normal limits   ACETAMINOPHEN LEVEL - Abnormal; Notable for the following components:    Acetaminophen (Tylenol), Serum <3.0 (*)     All other components within normal limits   SALICYLATE LEVEL - Abnormal; Notable for the following components:    Salicylate Lvl <5.0 (*)     All other components within normal limits   URINALYSIS MICROSCOPIC - Abnormal; Notable for the following components:    WBC, UA 8 (*)     Bacteria Few (*)     All other components within normal limits    Narrative:     Preferred Collection Type->Urine, Clean Catch   TSH   DRUG SCREEN PANEL, URINE EMERGENCY    Narrative:     Preferred Collection Type->Urine, Clean Catch   POCT URINE PREGNANCY     EKG Readings: (Independently Interpreted)   01:52: Sinus tach, . Normal axis. No STEMI.      ECG Results          EKG 12-lead (Final result)  Result time 02/06/20 20:37:58    Final result by Interface, Lab In [facility] (02/06/20 20:37:58)                 Narrative:    Test Reason : Z00.8,    Vent. Rate : 114 BPM     Atrial Rate : 114 BPM     P-R Int : 134 ms          QRS Dur : 066 ms      QT Int : 312 ms       P-R-T Axes : 062 052 070 degrees     QTc Int : 430 ms    Sinus tachycardia  Anterior infarct (cited on or before 21-MAY-2019)  Abnormal ECG  When compared with ECG of 21-MAY-2019 13:09,  No  significant change was found  Confirmed by Sukhdev Saravia MD ([id number]) on 2/6/2020 8:37:46 PM    Referred By: [email or web address]   SELF           Confirmed By:Sukhdev Saravia MD                            Imaging Results    None          Medical Decision Making:   History:   Old Medical Records: I decided to obtain old medical records.  Old Records Summarized: records from previous admission(s).  Initial Assessment:   24 y.o. Female with depression, vague SI.  Differential Diagnosis:   Ddx includes acute psychotic episode, SI/danger to self, HI/danger to others, but also toxidrome, withdrawal (eg from EtOH or BZD therapy), electrolyte derangement, serotonin syndrome, unusual pathology like anti-NMDA receptor encephalitis, other.  Independently Interpreted Test(s):   I have ordered and independently interpreted EKG Reading(s) - see prior notes  Clinical Tests:   Lab Tests: Reviewed and Ordered  Medical Tests: Reviewed and Ordered  ED Management:  UPT negative.    EKG no STEMI.    Labs overall reassuring.    Patient PEC'd as danger to self. She is medically cleared for psych transfer.                                 Clinical Impression:       ICD-10-CM ICD-9-CM   1. Depression with suicidal ideation F32.9 311    R45.851 V62.84   2. Medical clearance for psychiatric admission Z00.8 V70.8   3. Suicidal ideation R45.851 V62.84   4. Auditory hallucinations R44.0 780.1                             Gricelda Agudelo MD  02/09/20 1719     Detail Level: Zone Render In Strict Bullet Format?: No Initiate Treatment: Dupixent pre authorization will begin Initiate Treatment: doxycycline hyclate 100 mg capsule

## 2021-07-14 ENCOUNTER — OFFICE VISIT (OUTPATIENT)
Dept: FAMILY MEDICINE CLINIC | Age: 26
End: 2021-07-14
Payer: MEDICAID

## 2021-07-14 VITALS
WEIGHT: 283 LBS | DIASTOLIC BLOOD PRESSURE: 88 MMHG | TEMPERATURE: 98.3 F | SYSTOLIC BLOOD PRESSURE: 127 MMHG | OXYGEN SATURATION: 100 % | HEART RATE: 89 BPM

## 2021-07-14 DIAGNOSIS — Z01.89 ENCOUNTER FOR LABORATORY TEST: ICD-10-CM

## 2021-07-14 DIAGNOSIS — I10 HYPERTENSION, UNSPECIFIED TYPE: ICD-10-CM

## 2021-07-14 DIAGNOSIS — F41.1 GAD (GENERALIZED ANXIETY DISORDER): Primary | ICD-10-CM

## 2021-07-14 DIAGNOSIS — L73.8 FOLLICULITIS BARBAE: ICD-10-CM

## 2021-07-14 DIAGNOSIS — F32.A DEPRESSION, UNSPECIFIED DEPRESSION TYPE: ICD-10-CM

## 2021-07-14 DIAGNOSIS — K21.9 GASTROESOPHAGEAL REFLUX DISEASE WITHOUT ESOPHAGITIS: ICD-10-CM

## 2021-07-14 PROCEDURE — 99205 OFFICE O/P NEW HI 60 MIN: CPT | Performed by: NURSE PRACTITIONER

## 2021-07-14 RX ORDER — AMLODIPINE BESYLATE 5 MG/1
5 TABLET ORAL DAILY
Qty: 30 TABLET | Refills: 2 | Status: SHIPPED | OUTPATIENT
Start: 2021-07-14 | End: 2022-03-23 | Stop reason: SDUPTHER

## 2021-07-14 RX ORDER — PHENOL/SODIUM PHENOLATE
20 AEROSOL, SPRAY (ML) MUCOUS MEMBRANE DAILY
Qty: 30 TABLET | Refills: 2 | Status: SHIPPED | OUTPATIENT
Start: 2021-07-14 | End: 2022-06-10

## 2021-07-14 NOTE — PROGRESS NOTES
History of Present Illness  Zhen Frias is a 23 y.o. female who presents today for management of:    Chief Complaint   Patient presents with    Follow-up     bumps in her breast BL and under her arms x's 4 years ( stated she was treated with amoxicillen for them before) stated she has never had a mammo    Anxiety     she wants to get back on meds for this, she stated she has insurance now and can afford to get them    Depression     x's 6 years    Hypertension     148/106 at home with headache    Abdominal Pain     heavy periods and cramping last after period is over     Past Medical History  History reviewed. No pertinent past medical history. Surgical History  History reviewed. No pertinent surgical history. Current Medications  No current outpatient medications on file. No current facility-administered medications for this visit. Allergies/Drug Reactions  No Known Allergies     Family History  History reviewed. No pertinent family history. Social History  Social History     Tobacco Use    Smoking status: Current Every Day Smoker     Types: Cigarettes    Tobacco comment: smokes 3 a day   Vaping Use    Vaping Use: Never assessed   Substance Use Topics    Alcohol use: Not Currently     Alcohol/week: 1.0 standard drinks     Types: 1 Shots of liquor per week    Drug use: Not on file        Health Maintenance   Topic Date Due    Hepatitis C Screening  Never done    HPV Age 9Y-34Y (1 - 2-dose series) Never done    COVID-19 Vaccine (1) Never done    DTaP/Tdap/Td series (1 - Tdap) Never done    PAP AKA CERVICAL CYTOLOGY  Never done    Flu Vaccine (1) 09/01/2021    Pneumococcal 0-64 years  Aged Out       There is no immunization history on file for this patient. Review of Systems  Review of Systems   Constitutional: Negative. HENT: Negative. Eyes: Negative. Respiratory: Negative. Cardiovascular: Negative. Gastrointestinal: Positive for heartburn.  Negative for abdominal pain, blood in stool, constipation, diarrhea, melena, nausea and vomiting. Genitourinary: Negative. Musculoskeletal: Negative. Neurological: Negative. Psychiatric/Behavioral: Positive for depression. Negative for suicidal ideas. The patient is nervous/anxious and has insomnia. Physical Exam  Vital signs:   Vitals:    07/14/21 1333   BP: 127/88   Pulse: 89   Temp: 98.3 °F (36.8 °C)   SpO2: 100%   Weight: 283 lb (128.4 kg)       General: alert, oriented, not in distress  Head: scalp normal, atraumatic  Eyes: pupils are equal and reactive, full and intact EOM's  Ears: patent ear canal, intact tympanic membrane  Nose: normal turbinates, no congestion or discharge  Lips/Mouth: moist lips and buccal mucosa, non-enlarged tonsils, pink throat  Neck: supple, no JVD, no lymphadenopathy, non-palpable thyroid  Chest/Lungs: clear breath sounds, no wheezing or crackles  Heart: normal rate, regular rhythm, no murmur  Abdomen: soft, non-distended, non-tender, normal bowel sounds, no organomegaly, no masses  Extremities: no focal deformities, no edema  Skin: no active skin lesions    Laboratory/Tests:  No visits with results within 3 Month(s) from this visit. Latest known visit with results is:   No results found for any previous visit. Patient reports bilateral breast fold lesions with 2 year history. Lesions do not itch, but do cause pain. Topical neosporin and nystatin cream have not resolved lesions. Patient reports lesions weep with purulent drainage. Left flank folliculitis lesions observed which was painful to touch. Under left and right breast lesions observed. Patient reports no current lesions at bilateral axilla at this time, but has history of lesions. Patient reports occasional headache and she will check her blood pressure, which will be elevated. During these periods her blood pressure will read 140s/100s.   Patient reports increased anxiety and has previous history of anxiety disorder. She reports she was previously prescribed Buspar, but was unable to afford medication and did not take medication after exhaustion of 5 day supply. Assessment/Plan:    1. Folliculitis. Patient will be referred to dermatology for management of folliculitis and painful lesions. 2. Generalized anxiety disorder with depression. Patient will prescribed Lexapro 10 mg daily and referral to psychiatry will be ordered. 3. Essential hypertension. Patient will be prescribe Amlodipine 5 mg daily for hypertension management. 4.  Obesity. Patient educated of additional risk factors to health related to obesity and comorbidities. I have discussed the diagnosis with the patient and the intended plan as seen in the above orders. The patient has received an after-visit summary and questions were answered concerning future plans. I have discussed medication side effects and warnings with the patient as well. I have reviewed the plan of care with the patient, accepted their input and they are in agreement with the treatment goals.        Guillermina Zaragoza NP  July 14, 2021

## 2021-07-14 NOTE — PROGRESS NOTES
Zhen Ciarra Harrington presents today for   Chief Complaint   Patient presents with    Follow-up     bumps in her breast BL and under her arms x's 4 years ( stated she was treated with amoxicillen for them before) stated she has never had a mammo    Anxiety     she wants to get back on meds for this, she stated she has insurance now and can afford to get them    Depression     x's 6 years    Hypertension     148/106 at home with headache    Abdominal Pain     heavy periods and cramping last after period is over       Is someone accompanying this pt? yes    Is the patient using any DME equipment during OV? no    Depression Screening:  3 most recent PHQ Screens 7/14/2021   Little interest or pleasure in doing things Not at all   Feeling down, depressed, irritable, or hopeless Nearly every day   Total Score PHQ 2 3   Trouble falling or staying asleep, or sleeping too much Nearly every day   Feeling tired or having little energy Nearly every day   Poor appetite, weight loss, or overeating More than half the days   Feeling bad about yourself - or that you are a failure or have let yourself or your family down Nearly every day   Trouble concentrating on things such as school, work, reading, or watching TV Nearly every day   Moving or speaking so slowly that other people could have noticed; or the opposite being so fidgety that others notice Not at all   Thoughts of being better off dead, or hurting yourself in some way Not at all   PHQ 9 Score 17   How difficult have these problems made it for you to do your work, take care of your home and get along with others Somewhat difficult       Learning Assessment:  No flowsheet data found. Fall Risk  No flowsheet data found.     ADL  ADL Assessment 7/14/2021   Feeding yourself No Help Needed   Getting from bed to chair No Help Needed   Getting dressed No Help Needed   Bathing or showering No Help Needed   Walk across the room (includes cane/walker) No Help Needed Using the telphone No Help Needed   Taking your medications No Help Needed   Preparing meals No Help Needed   Managing money (expenses/bills) No Help Needed   Moderately strenuous housework (laundry) No Help Needed   Shopping for personal items (toiletries/medicines) No Help Needed   Shopping for groceries No Help Needed   Driving Help Needed   Climbing a flight of stairs No Help Needed   Getting to places beyond walking distances No Help Needed       Travel Screening:    Travel Screening      No screening recorded since 07/13/21 0000      Travel History   Travel since 06/14/21     No documented travel since 06/14/21          Health Maintenance reviewed and discussed and ordered per Provider. Health Maintenance Due   Topic Date Due    Hepatitis C Screening  Never done    HPV Age 9Y-34Y (1 - 2-dose series) Never done    COVID-19 Vaccine (1) Never done    DTaP/Tdap/Td series (1 - Tdap) Never done    PAP AKA CERVICAL CYTOLOGY  Never done   . Coordination of Care:  1. Have you been to the ER, urgent care clinic since your last visit? Hospitalized since your last visit? no    2. Have you seen or consulted any other health care providers outside of the [address] [place] [address] since your last visit? Include any pap smears or colon screening.  no

## 2021-07-15 ENCOUNTER — TELEPHONE (OUTPATIENT)
Dept: FAMILY MEDICINE CLINIC | Age: 26
End: 2021-07-15

## 2021-07-29 RX ORDER — ESCITALOPRAM OXALATE 10 MG/1
10 TABLET ORAL DAILY
Qty: 30 TABLET | Refills: 2 | Status: SHIPPED | OUTPATIENT
Start: 2021-07-29 | End: 2022-06-10

## 2022-03-23 DIAGNOSIS — F41.1 GAD (GENERALIZED ANXIETY DISORDER): ICD-10-CM

## 2022-03-23 DIAGNOSIS — I10 HYPERTENSION, UNSPECIFIED TYPE: ICD-10-CM

## 2022-03-23 RX ORDER — AMLODIPINE BESYLATE 5 MG/1
5 TABLET ORAL DAILY
Qty: 30 TABLET | Refills: 2 | Status: SHIPPED | OUTPATIENT
Start: 2022-03-23 | End: 2022-06-10

## 2022-03-23 NOTE — TELEPHONE ENCOUNTER
----- Message from Angela Maharaj sent at 3/18/2022  1:43 PM EDT -----  Subject: Medication Problem    QUESTIONS  Name of Medication? amLODIPine (NORVASC) 5 mg tablet  Patient-reported dosage and instructions? 5mg   What question or problem do you have with the medication? patient has an   appt scheduled 4/19 for a med f/u. pt is out of this medication and is   wanting to know if it can be filled prior to appt  Preferred Pharmacy? [facility] [address] phone number (if available)? [phone]  Additional Information for Provider?   ---------------------------------------------------------------------------  --------------  [address]  What is the best way for the office to contact you? OK to leave message on   voicemail  Preferred Call Back Phone Number? [phone]  ---------------------------------------------------------------------------  --------------  SCRIPT ANSWERS  Relationship to Patient?  Self

## 2022-06-10 ENCOUNTER — APPOINTMENT (OUTPATIENT)
Dept: GENERAL RADIOLOGY | Age: 27
End: 2022-06-10
Attending: INTERNAL MEDICINE
Payer: MEDICAID

## 2022-06-10 ENCOUNTER — HOSPITAL ENCOUNTER (EMERGENCY)
Age: 27
Discharge: HOME | End: 2022-06-10
Attending: INTERNAL MEDICINE
Payer: MEDICAID

## 2022-06-10 VITALS
SYSTOLIC BLOOD PRESSURE: 140 MMHG | TEMPERATURE: 98 F | OXYGEN SATURATION: 100 % | WEIGHT: 280 LBS | BODY MASS INDEX: 43.95 KG/M2 | HEIGHT: 67 IN | RESPIRATION RATE: 18 BRPM | HEART RATE: 91 BPM | DIASTOLIC BLOOD PRESSURE: 85 MMHG

## 2022-06-10 DIAGNOSIS — S82.839A AVULSION FRACTURE OF DISTAL FIBULA: Primary | ICD-10-CM

## 2022-06-10 PROCEDURE — 73600 X-RAY EXAM OF ANKLE: CPT

## 2022-06-10 PROCEDURE — 99283 EMERGENCY DEPT VISIT LOW MDM: CPT

## 2022-06-10 PROCEDURE — 73560 X-RAY EXAM OF KNEE 1 OR 2: CPT

## 2022-06-10 PROCEDURE — 74011250637 HC RX REV CODE- 250/637: Performed by: INTERNAL MEDICINE

## 2022-06-10 PROCEDURE — 73630 X-RAY EXAM OF FOOT: CPT

## 2022-06-10 RX ORDER — KETOROLAC TROMETHAMINE 10 MG/1
10 TABLET, FILM COATED ORAL
Qty: 10 TABLET | Refills: 0 | Status: SHIPPED | OUTPATIENT
Start: 2022-06-10

## 2022-06-10 RX ORDER — BACITRACIN 500 UNIT/G
PACKET (EA) TOPICAL
Status: DISCONTINUED | OUTPATIENT
Start: 2022-06-10 | End: 2022-06-10 | Stop reason: HOSPADM

## 2022-06-10 RX ORDER — HYDROCODONE BITARTRATE AND ACETAMINOPHEN 5; 325 MG/1; MG/1
2 TABLET ORAL
Status: COMPLETED | OUTPATIENT
Start: 2022-06-10 | End: 2022-06-10

## 2022-06-10 RX ADMIN — HYDROCODONE BITARTRATE AND ACETAMINOPHEN 2 TABLET: 5; 325 TABLET ORAL at 13:29

## 2022-06-10 NOTE — ED TRIAGE NOTES
Was walking out of the house and tripped and fell face forward but caught herself but has pain in the left ankle, hip and knee.   Most pain is in the ankle, states that it rolled outward when she fell - felt a crack

## 2022-06-10 NOTE — ED PROVIDER NOTES
[facility] DEPARTMENT HISTORY AND PHYSICAL EXAM      Date: 6/10/2022  Patient Name: Laura Gutierrez      History of Presenting Illness     Chief Complaint   Patient presents with    Ankle Injury       History Provided By: Patient    HPI: Zhen Basurto, 27 y.o. female with no significant past medical history that presents to the ED with cc of ground level fall. Pt states that she tripped on a sidewalk and scratched her right little right   Finger; her right elbow and both knees. Most of the injury is to her left foot where she has pain in the inner ankle and the toes of the foot. No head injury. There are no other complaints, changes, or physical findings at this time. PCP: Jennifer Arnett NP    Current Facility-Administered Medications   Medication Dose Route Frequency Provider Last Rate Last Admin    bacitracin 500 unit/gram packet   Topical NOW Jacob Brown MD         Current Outpatient Medications   Medication Sig Dispense Refill    ketorolac (TORADOL) 10 mg tablet Take 1 Tablet by mouth every six (6) hours as needed for Pain for up to 10 doses. 10 Tablet 0       Past History     Past Medical History:  History reviewed. No pertinent past medical history. Past Surgical History:  History reviewed. No pertinent surgical history. Family History:  History reviewed. No pertinent family history. Social History:  Social History     Tobacco Use    Smoking status: Current Every Day Smoker     Types: Cigarettes    Smokeless tobacco: Never Used    Tobacco comment: smokes 3 a day   Vaping Use    Vaping Use: Not on file   Substance Use Topics    Alcohol use: Not Currently     Alcohol/week: 1.0 standard drink     Types: 1 Shots of liquor per week    Drug use: Never       Allergies:  No Known Allergies      Review of Systems     Review of Systems   Constitutional: Negative for chills and fever. HENT: Negative for sore throat. Eyes: Negative for visual disturbance. Respiratory: Negative for cough and shortness of breath. Cardiovascular: Negative for chest pain. Gastrointestinal: Negative for abdominal pain, diarrhea, nausea and vomiting. Musculoskeletal: Positive for arthralgias. Skin: Positive for wound. Neurological: Negative for headaches. Psychiatric/Behavioral: Negative for confusion. Physical Exam     Physical Exam  Vitals and nursing note reviewed. Constitutional:       General: She is not in acute distress. Appearance: She is obese. She is not ill-appearing. HENT:      Head: Atraumatic. Mouth/Throat:      Pharynx: Oropharynx is clear. Eyes:      Conjunctiva/sclera: Conjunctivae normal.   Pulmonary:      Effort: Pulmonary effort is normal.   Musculoskeletal:      Cervical back: Neck supple. Comments: Abrasion right little finger and right elbow. No signs of bony injury to either area. Bilat knee abrasions with normal rom; no effusion; no instability. Left foot w pain in the medial malleolar area and the toes. No visible deformity; ecchymosis; lacs   Skin:     General: Skin is warm and dry. Comments: Abrasions as above   Neurological:      Mental Status: She is alert and oriented to person, place, and time. Psychiatric:         Behavior: Behavior normal.         Lab and Diagnostic Study Results     Labs -   No results found for this or any previous visit (from the past 12 hour(s)). Radiologic Studies -   [unfilled]  CT Results  (Last 48 hours)    None        CXR Results  (Last 48 hours)    None          Medical Decision Making and ED Course   - I am the first and primary provider for this patient AND AM THE PRIMARY PROVIDER OF RECORD. - I reviewed the vital signs, available nursing notes, past medical history, past surgical history, family history and social history. - Initial assessment performed.  The patients presenting problems have been discussed, and the staff are in agreement with the care plan formulated and outlined with them. I have encouraged them to ask questions as they arise throughout their visit. Vital Signs-Reviewed the patient's vital signs. Patient Vitals for the past 12 hrs:   Temp Pulse Resp BP SpO2   06/10/22 1328 -- 91 18 (!) 140/85 100 %   06/10/22 1216 -- -- -- -- 100 %   06/10/22 1208 98 °F (36.7 °C) (!) 108 18 (!) 145/59 98 %           Records Reviewed: Nursing Notes      ED Course:     IMPRESSION     Tiny ossific fragments at the distal fibular tip suggestive of avulsive fracture  injury. Consultations:       Consultations:     Procedures and Critical Care         Disposition     Disposition: Discharge    Remove if not discharged  DISCHARGE PLAN:  1. There are no discharge medications for this patient. 2.   Follow-up Information     Follow up With Specialties Details Why Contact Info    Jerri Mera MD Orthopedic Surgery Schedule an appointment as soon as possible for a visit in 3 days  [address]  [phone]          3. Return to ED if worse   4. Current Discharge Medication List      START taking these medications    Details   ketorolac (TORADOL) 10 mg tablet Take 1 Tablet by mouth every six (6) hours as needed for Pain for up to 10 doses. Qty: 10 Tablet, Refills: 0  Start date: 6/10/2022             Diagnosis     Clinical Impression:   1. Avulsion fracture of distal fibula        Attestations:    Jonathon Kay MD    Please note that this dictation was completed with [employer], the computer voice recognition software. Quite often unanticipated grammatical, syntax, homophones, and other interpretive errors are inadvertently transcribed by the computer software. Please disregard these errors. Please excuse any errors that have escaped final proofreading. Thank you.

## 2022-06-14 ENCOUNTER — OFFICE VISIT (OUTPATIENT)
Dept: ORTHOPEDIC SURGERY | Age: 27
End: 2022-06-14
Payer: MEDICAID

## 2022-06-14 VITALS — WEIGHT: 280 LBS | BODY MASS INDEX: 43.95 KG/M2 | HEIGHT: 67 IN

## 2022-06-14 DIAGNOSIS — S93.602A SPRAIN OF LEFT FOOT, INITIAL ENCOUNTER: Primary | ICD-10-CM

## 2022-06-14 PROCEDURE — 99203 OFFICE O/P NEW LOW 30 MIN: CPT | Performed by: ORTHOPAEDIC SURGERY

## 2022-06-14 NOTE — PROGRESS NOTES
Name: Tatum Acevedo    : 1995     Service Dept: [facility]    Chief Complaint   Patient presents with    Ankle Pain     Left        Visit Vitals  Ht 5' 7\" (1.702 m)   Wt 280 lb (127 kg)   BMI 43.85 kg/m²        No Known Allergies     Current Outpatient Medications   Medication Sig Dispense Refill    ketorolac (TORADOL) 10 mg tablet Take 1 Tablet by mouth every six (6) hours as needed for Pain for up to 10 doses. 10 Tablet 0      There is no problem list on file for this patient. No family history on file. Social History     Socioeconomic History    Marital status: SINGLE   Tobacco Use    Smoking status: Current Every Day Smoker     Types: Cigarettes    Smokeless tobacco: Never Used    Tobacco comment: smokes 3 a day   Substance and Sexual Activity    Alcohol use: Not Currently     Alcohol/week: 1.0 standard drink     Types: 1 Shots of liquor per week    Drug use: Never      No past surgical history on file. Past Medical History:   Diagnosis Date    Hypertension         I have reviewed and agree with [address] [place] [address] and [facility] and intake form in chart and the record furthermore I have reviewed prior medical record(s) regarding this patients care during this appointment. Review of Systems:   Patient is a pleasant appearing individual, appropriately dressed, well hydrated, well nourished, who is alert, appropriately oriented for age, and in no acute distress with a wheelchair bound gait and normal affect who does not appear to be in any significant pain. Physical Exam:  Left Ankle-Point tenderness to palpation lateral aspect on ankle, Decreased range of motion with flexion and extension, No gross instability, Weakness with plantar flexion, No skin abrasions, Positive for swelling, Grossly neurovascularly intact.     Right Ankle- No point tenderness, Full range of motion, No instability, No Weakness, No, skin lesions, No swelling, Grossly neurovascularly intact. Please note that a DME was provided from our office and fitted to an appropriate size. DME provided will help decrease soft tissue swelling, assist with stabilization, and decrease pain with immobilization. [facility] will bill. Encounter Diagnoses     ICD-10-CM ICD-9-CM   1. Sprain of left foot, initial encounter  S93.602A 845.10       HPI:  The patient is here with a chief complaint of left ankle and foot pain, throbbing, burning pain. Pain is 5/10. X-rays of the left foot and ankle are unremarkable. Continues to have difficulty. X-rays of the foot and ankle were negative. Assessment/Plan:  1. Left ankle sprain. Plan will be for Cam boot, out of work for 4 weeks. We will see her back in 3 to 4 weeks. If no better, we will consider physical therapy. As part of continued conservative pain management options the patient was advised to utilize Tylenol or OTC NSAIDS as long as it is not medically contraindicated. Return to Office: Follow-up and Dispositions    · Return in about 4 weeks (around 7/12/2022). Scribed by Pradip Milian as dictated by Hermilo Neal MD.  Documentation True and Accepted Ambrosio Neal MD

## 2022-06-14 NOTE — LETTER
6/14/2022      RE: Zhen Nascimento      To Whom It May Concern,    This is to certify that Zhen Nascimento may not return to work for 4 weeks. Please feel free to contact my office if you have any questions or concerns. Sincerely,  Ambrosio Terrell MD

## 2022-06-14 NOTE — PATIENT INSTRUCTIONS
Ankle Sprain: Care Instructions  Your Care Instructions     An ankle sprain can happen when you twist your ankle. The ligaments that support the ankle can get stretched and torn. Often the ankle is swollen and painful. Ankle sprains may take from several weeks to several months to heal. Usually, the more pain and swelling you have, the more severe your ankle sprain is and the longer it will take to heal. You can heal faster and regain strength in your ankle with good home treatment. It is very important to give your ankle time to heal completely, so that you do not easily hurt your ankle again. Follow-up care is a key part of your treatment and safety. Be sure to make and go to all appointments, and call your doctor if you are having problems. It's also a good idea to know your test results and keep a list of the medicines you take. How can you care for yourself at home? · Prop up your foot on pillows as much as possible for the next 3 days. Try to keep your ankle above the level of your heart. This will help reduce the swelling. · Follow your doctor's directions for wearing a splint or elastic bandage. Wrapping the ankle may help reduce or prevent swelling. · Your doctor may give you a splint, a brace, an air stirrup, or another form of ankle support to protect your ankle until it is healed. Wear it as directed while your ankle is healing. Do not remove it unless your doctor tells you to. After your ankle has healed, ask your doctor whether you should wear the brace when you exercise. · Put ice or cold packs on your injured ankle for 10 to 20 minutes at a time. Try to do this every 1 to 2 hours for the next 3 days (when you are awake) or until the swelling goes down. Put a thin cloth between the ice and your skin. · You may need to use crutches until you can walk without pain. If you do use crutches, try to bear some weight on your injured ankle if you can do so without pain.  This helps the ankle heal.  · Take pain medicines exactly as directed. ? If the doctor gave you a prescription medicine for pain, take it as prescribed. ? If you are not taking a prescription pain medicine, ask your doctor if you can take an over-the-counter medicine. · If you have been given ankle exercises to do at home, do them exactly as instructed. These can promote healing and help prevent lasting weakness. When should you call for help? Call your doctor now or seek immediate medical care if:    · Your pain is getting worse.     · Your swelling is getting worse.     · Your splint feels too tight or you are unable to loosen it. Watch closely for changes in your health, and be sure to contact your doctor if:    · You are not getting better after 1 week. Where can you learn more? Go to http://www.gray.com/  Enter M071 in the search box to learn more about \"Ankle Sprain: Care Instructions. \"  Current as of: July 1, 2021               Content Version: 13.2  © 2006-2022 [employer]. Care instructions adapted under license by [employer] (which disclaims liability or warranty for this information). If you have questions about a medical condition or this instruction, always ask your healthcare professional. [place] [address] any warranty or liability for your use of this information.

## 2022-06-14 NOTE — LETTER
6/16/2022    Patient: Shantelle Hdz   YOB: 1995   Date of Visit: 6/14/2022     Wanda Downs NP  [facility] [phone]  [address] [facility] [address]    Dear Wanda Downs NP,      Thank you for referring Ms. Zhen Fermin to [address] [place] [address] AND [facility] for evaluation. My notes for this consultation are attached. If you have questions, please do not hesitate to call me. I look forward to following your patient along with you.       Sincerely,    Joy Reynolds MD

## 2022-08-23 ENCOUNTER — TELEPHONE (OUTPATIENT)
Dept: FAMILY MEDICINE CLINIC | Age: 27
End: 2022-08-23

## 2022-08-25 ENCOUNTER — TELEPHONE (OUTPATIENT)
Dept: FAMILY MEDICINE CLINIC | Age: 27
End: 2022-08-25

## 2022-08-25 NOTE — TELEPHONE ENCOUNTER
----- Message from Krystal Addison sent at 8/17/2022  3:56 PM EDT -----  Subject: Appointment Request    Reason for Call: Established Patient Appointment needed: Routine [facility]    Reason for appointment request? No appointments available during search     Additional Information for Provider? Patient wants to see if could be seen   sooner for an appt to discuss anxiety issues going on and may need to go   on meds again.  She just took custody for her brothers and sisters and alot   going on. cb to schedule can't come in tomorrow but could after that.   ---------------------------------------------------------------------------  --------------  [employer] [employer]  [phone]; OK to leave message on voicemail  ---------------------------------------------------------------------------  --------------  SCRIPT ANSWERS  COVID Screen: Kath Jasso

## 2022-08-26 ENCOUNTER — OFFICE VISIT (OUTPATIENT)
Dept: FAMILY MEDICINE CLINIC | Age: 27
End: 2022-08-26
Payer: MEDICAID

## 2022-08-26 VITALS
TEMPERATURE: 98.2 F | BODY MASS INDEX: 45.99 KG/M2 | HEIGHT: 67 IN | DIASTOLIC BLOOD PRESSURE: 100 MMHG | RESPIRATION RATE: 20 BRPM | WEIGHT: 293 LBS | OXYGEN SATURATION: 99 % | SYSTOLIC BLOOD PRESSURE: 150 MMHG | HEART RATE: 100 BPM

## 2022-08-26 DIAGNOSIS — F41.9 ANXIETY: ICD-10-CM

## 2022-08-26 DIAGNOSIS — R68.89 OTHER GENERAL SYMPTOMS AND SIGNS: ICD-10-CM

## 2022-08-26 DIAGNOSIS — Z13.220 SCREENING CHOLESTEROL LEVEL: ICD-10-CM

## 2022-08-26 DIAGNOSIS — E66.01 MORBID OBESITY WITH BMI OF 45.0-49.9, ADULT (HCC): ICD-10-CM

## 2022-08-26 DIAGNOSIS — Z11.59 NEED FOR HEPATITIS C SCREENING TEST: ICD-10-CM

## 2022-08-26 DIAGNOSIS — F33.1 MODERATE EPISODE OF RECURRENT MAJOR DEPRESSIVE DISORDER (HCC): ICD-10-CM

## 2022-08-26 DIAGNOSIS — Z84.89 FAMILY HISTORY OF SUDDEN DEATH IN FATHER: Primary | ICD-10-CM

## 2022-08-26 DIAGNOSIS — Z13.9 SCREENING DUE: ICD-10-CM

## 2022-08-26 DIAGNOSIS — I10 ESSENTIAL HYPERTENSION: ICD-10-CM

## 2022-08-26 DIAGNOSIS — E55.9 VITAMIN D DEFICIENCY: ICD-10-CM

## 2022-08-26 DIAGNOSIS — Z13.1 DIABETES MELLITUS SCREENING: ICD-10-CM

## 2022-08-26 PROCEDURE — 99214 OFFICE O/P EST MOD 30 MIN: CPT | Performed by: NURSE PRACTITIONER

## 2022-08-26 RX ORDER — ESCITALOPRAM OXALATE 10 MG/1
10 TABLET ORAL DAILY
Qty: 30 TABLET | Refills: 2 | Status: SHIPPED | OUTPATIENT
Start: 2022-08-26

## 2022-08-26 RX ORDER — AMLODIPINE BESYLATE 5 MG/1
5 TABLET ORAL DAILY
Qty: 90 TABLET | Refills: 1 | Status: SHIPPED | OUTPATIENT
Start: 2022-08-26

## 2022-08-26 NOTE — PROGRESS NOTES
History of Present Illness  Zhen Olivares is a 27 y.o. female who presents today for:    Chief Complaint   Patient presents with    Annual Wellness Visit    Anxiety     Patient has concerns with weight, anxiety, depression, skin, and blood pressure. Father and aunt passed away from aneurysms so patient would like to speak about that. Past Medical History  Past Medical History:   Diagnosis Date    Hypertension         Surgical History  History reviewed. No pertinent surgical history. Current Medications  Current Outpatient Medications   Medication Sig    escitalopram oxalate (LEXAPRO) 10 mg tablet Take 1 Tablet by mouth daily. amLODIPine (NORVASC) 5 mg tablet Take 1 Tablet by mouth daily. ketorolac (TORADOL) 10 mg tablet Take 1 Tablet by mouth every six (6) hours as needed for Pain for up to 10 doses. (Patient not taking: Reported on 8/26/2022)     No current facility-administered medications for this visit. Allergies/Drug Reactions  No Known Allergies     Family History  History reviewed. No pertinent family history.      Social History  Social History     Tobacco Use    Smoking status: Every Day     Types: Cigarettes    Smokeless tobacco: Never    Tobacco comments:     smokes 3 a day   Substance Use Topics    Alcohol use: Not Currently     Alcohol/week: 1.0 standard drink     Types: 1 Shots of liquor per week    Drug use: Never        Health Maintenance   Topic Date Due    Hepatitis C Screening  Never done    Pneumococcal 0-64 years (1 - PCV) Never done    HPV Age 9Y-34Y (1 - 2-dose series) Never done    DTaP/Tdap/Td series (1 - Tdap) Never done    Pap Smear  Never done    COVID-19 Vaccine (1) 12/29/2023 (Originally 4/30/1996)    Flu Vaccine (1) 09/01/2022    Depression Monitoring  06/14/2023       Physical Exam  Vital signs:   Vitals:    08/26/22 1111 08/26/22 1115   BP: (!) 145/96 (!) 150/100   Pulse: 100    Resp: 20    Temp: 98.2 °F (36.8 °C)    TempSrc: Temporal    SpO2: 99% Weight: 312 lb 9.6 oz (141.8 kg)    Height: 5' 7\" (1.702 m)        Physical Exam     Laboratory/Tests:  No visits with results within 3 Month(s) from this visit. Latest known visit with results is:   No results found for any previous visit. Patient reports death of father and two aunts which have  within last year. Patient reports increased depression and anxiety. She reports she has custody of 3 children due to her father's death. One of the 3 children has some cognitive deficits. Patient reports she has been out of her prescribed Amlodipine 5 mg tablet daily. Patient's blood pressure was elevated during this visit which is attributed to increased stress as well as exhaustion of her prescribed Amlodipine. Will refill amlodipine at this visit. Patient reports increased stress and anxiety associated with the custody of her father's 3 children. She was previously prescribed Lexapro 10 mg tablet by this provider, but patient reports the medication was not available at the pharmacy. Will reorder Lexapro 10 mg tablet daily at this visit. Patient reports a death of her father and her 2 aunts she has increased her caloric intake of high-calorie foods. She reports this is caused her to gain weight within the last 3 months. I have educated the patient to decrease her caloric intake. She reports she used phone application which tracked her daily calories, but she has not installed the application on her new phone. Patient reports his application will keep track of her daily calories and warn her if they were over 2000/day. Patient reports this helped to decrease her weight when she use the application. Assessment/Plan:    Family history of sudden death in father, anxiety and depression. Prescribed Lexapro 10 mg tablet daily. Essential hypertension. Refilled and continue Amlodipine 5 mg tablet daily for management of essential hypertension. Morbid obesity.   Patient advised to decrease caloric intake and increase physical activity to lower risk associated with morbid obesity. 4.   Screening due. Patient referred to gynecology for women's wellness examination. I have discussed the diagnosis with the patient and the intended plan as seen in the above orders. The patient has received an after-visit summary and questions were answered concerning future plans. I have discussed medication side effects and warnings with the patient as well. I have reviewed the plan of care with the patient, accepted their input and they are in agreement with the treatment goals.        Sienna Ndiaye NP  August 26, 2022

## 2022-08-26 NOTE — PROGRESS NOTES
[facility] Rad[facility] Pretty presents today for   Chief Complaint   Patient presents with    Annual Wellness Visit    Anxiety     Patient has concerns with weight, anxiety, depression, skin, and blood pressure. Is someone accompanying this pt? no    Is the patient using any DME equipment during [address]? no    Depression Screening:  3 most recent PHQ Screens 8/26/2022   Little interest or pleasure in doing things Nearly every day   Feeling down, depressed, irritable, or hopeless Nearly every day   Total Score PHQ 2 6   Trouble falling or staying asleep, or sleeping too much Nearly every day   Feeling tired or having little energy Nearly every day   Poor appetite, weight loss, or overeating Nearly every day   Feeling bad about yourself - or that you are a failure or have let yourself or your family down Nearly every day   Trouble concentrating on things such as school, work, reading, or watching TV Nearly every day   Moving or speaking so slowly that other people could have noticed; or the opposite being so fidgety that others notice Not at all   Thoughts of being better off dead, or hurting yourself in some way Not at all   PHQ 9 Score 21   How difficult have these problems made it for you to do your work, take care of your home and get along with others Extremely difficult       Learning Assessment:  No flowsheet data found. Health Maintenance reviewed and discussed and ordered per Provider. Health Maintenance Due   Topic Date Due    Hepatitis C Screening  Never done    COVID-19 Vaccine (1) Never done    Pneumococcal 0-64 years (1 - PCV) Never done    HPV Age 9Y-34Y (1 - 2-dose series) Never done    DTaP/Tdap/Td series (1 - Tdap) Never done    Pap Smear  Never done   . Coordination of Care:  1. \"Have you been to the ER, urgent care clinic since your last visit? Hospitalized since your last visit? \" Yes Where: [facility] 2 months ago for broken ankle    2.  \"Have you seen or consulted any other health care providers outside of the [address] [place] [address] since your last visit? \" No     3. For patients aged 39-70: Has the patient had a colonoscopy? NA - based on age     If the patient is female:    4. For patients aged 41-77: Has the patient had a mammogram within the past 2 years? NA - based on age    11. For patients aged 21-65: Has the patient had a pap smear?  No

## 2023-03-08 RX ORDER — ESCITALOPRAM OXALATE 10 MG/1
TABLET ORAL
Qty: 90 TABLET | Refills: 1 | Status: SHIPPED | OUTPATIENT
Start: 2023-03-08 | End: 2023-05-14 | Stop reason: ALTCHOICE

## 2023-05-08 ENCOUNTER — TELEPHONE (OUTPATIENT)
Facility: CLINIC | Age: 28
End: 2023-05-08

## 2023-05-11 ENCOUNTER — OFFICE VISIT (OUTPATIENT)
Facility: CLINIC | Age: 28
End: 2023-05-11
Payer: COMMERCIAL

## 2023-05-11 VITALS
BODY MASS INDEX: 45.99 KG/M2 | HEART RATE: 125 BPM | HEIGHT: 67 IN | DIASTOLIC BLOOD PRESSURE: 95 MMHG | RESPIRATION RATE: 18 BRPM | OXYGEN SATURATION: 98 % | TEMPERATURE: 98.3 F | WEIGHT: 293 LBS | SYSTOLIC BLOOD PRESSURE: 151 MMHG

## 2023-05-11 DIAGNOSIS — R68.89 OTHER GENERAL SYMPTOMS AND SIGNS: ICD-10-CM

## 2023-05-11 DIAGNOSIS — N92.6 MISSED PERIODS: ICD-10-CM

## 2023-05-11 DIAGNOSIS — I10 ESSENTIAL (PRIMARY) HYPERTENSION: ICD-10-CM

## 2023-05-11 DIAGNOSIS — Z13.1 DIABETES MELLITUS SCREENING: ICD-10-CM

## 2023-05-11 DIAGNOSIS — F41.1 GENERALIZED ANXIETY DISORDER: Primary | ICD-10-CM

## 2023-05-11 DIAGNOSIS — E66.01 MORBID (SEVERE) OBESITY DUE TO EXCESS CALORIES (HCC): ICD-10-CM

## 2023-05-11 DIAGNOSIS — Z13.220 SCREENING CHOLESTEROL LEVEL: ICD-10-CM

## 2023-05-11 LAB
HCG QUALITATIVE, POC: NORMAL
HCG, PREGNANCY, URINE, POC: NEGATIVE
VALID INTERNAL CONTROL, POC: YES

## 2023-05-11 PROCEDURE — 3074F SYST BP LT 130 MM HG: CPT | Performed by: NURSE PRACTITIONER

## 2023-05-11 PROCEDURE — 4004F PT TOBACCO SCREEN RCVD TLK: CPT | Performed by: NURSE PRACTITIONER

## 2023-05-11 PROCEDURE — G8417 CALC BMI ABV UP PARAM F/U: HCPCS | Performed by: NURSE PRACTITIONER

## 2023-05-11 PROCEDURE — G8427 DOCREV CUR MEDS BY ELIG CLIN: HCPCS | Performed by: NURSE PRACTITIONER

## 2023-05-11 PROCEDURE — 84703 CHORIONIC GONADOTROPIN ASSAY: CPT | Performed by: NURSE PRACTITIONER

## 2023-05-11 PROCEDURE — 3078F DIAST BP <80 MM HG: CPT | Performed by: NURSE PRACTITIONER

## 2023-05-11 PROCEDURE — 99214 OFFICE O/P EST MOD 30 MIN: CPT | Performed by: NURSE PRACTITIONER

## 2023-05-11 RX ORDER — BUPROPION HYDROCHLORIDE 150 MG/1
150 TABLET, EXTENDED RELEASE ORAL 2 TIMES DAILY
Qty: 180 TABLET | Refills: 1 | Status: SHIPPED | OUTPATIENT
Start: 2023-05-11

## 2023-05-11 RX ORDER — AMLODIPINE BESYLATE 5 MG/1
5 TABLET ORAL DAILY
Qty: 90 TABLET | Refills: 3 | Status: SHIPPED | OUTPATIENT
Start: 2023-05-11

## 2023-05-11 SDOH — ECONOMIC STABILITY: HOUSING INSECURITY
IN THE LAST 12 MONTHS, WAS THERE A TIME WHEN YOU DID NOT HAVE A STEADY PLACE TO SLEEP OR SLEPT IN A SHELTER (INCLUDING NOW)?: NO

## 2023-05-11 SDOH — ECONOMIC STABILITY: FOOD INSECURITY: WITHIN THE PAST 12 MONTHS, THE FOOD YOU BOUGHT JUST DIDN'T LAST AND YOU DIDN'T HAVE MONEY TO GET MORE.: NEVER TRUE

## 2023-05-11 SDOH — ECONOMIC STABILITY: INCOME INSECURITY: HOW HARD IS IT FOR YOU TO PAY FOR THE VERY BASICS LIKE FOOD, HOUSING, MEDICAL CARE, AND HEATING?: NOT VERY HARD

## 2023-05-11 SDOH — ECONOMIC STABILITY: FOOD INSECURITY: WITHIN THE PAST 12 MONTHS, YOU WORRIED THAT YOUR FOOD WOULD RUN OUT BEFORE YOU GOT MONEY TO BUY MORE.: NEVER TRUE

## 2023-05-11 ASSESSMENT — PATIENT HEALTH QUESTIONNAIRE - PHQ9
2. FEELING DOWN, DEPRESSED OR HOPELESS: 3
SUM OF ALL RESPONSES TO PHQ QUESTIONS 1-9: 14
SUM OF ALL RESPONSES TO PHQ QUESTIONS 1-9: 14
7. TROUBLE CONCENTRATING ON THINGS, SUCH AS READING THE NEWSPAPER OR WATCHING TELEVISION: 1
3. TROUBLE FALLING OR STAYING ASLEEP: 3
10. IF YOU CHECKED OFF ANY PROBLEMS, HOW DIFFICULT HAVE THESE PROBLEMS MADE IT FOR YOU TO DO YOUR WORK, TAKE CARE OF THINGS AT HOME, OR GET ALONG WITH OTHER PEOPLE: 1
SUM OF ALL RESPONSES TO PHQ QUESTIONS 1-9: 14
5. POOR APPETITE OR OVEREATING: 0
9. THOUGHTS THAT YOU WOULD BE BETTER OFF DEAD, OR OF HURTING YOURSELF: 0
SUM OF ALL RESPONSES TO PHQ QUESTIONS 1-9: 14
8. MOVING OR SPEAKING SO SLOWLY THAT OTHER PEOPLE COULD HAVE NOTICED. OR THE OPPOSITE, BEING SO FIGETY OR RESTLESS THAT YOU HAVE BEEN MOVING AROUND A LOT MORE THAN USUAL: 0
SUM OF ALL RESPONSES TO PHQ9 QUESTIONS 1 & 2: 6
4. FEELING TIRED OR HAVING LITTLE ENERGY: 2
6. FEELING BAD ABOUT YOURSELF - OR THAT YOU ARE A FAILURE OR HAVE LET YOURSELF OR YOUR FAMILY DOWN: 2
1. LITTLE INTEREST OR PLEASURE IN DOING THINGS: 3

## 2023-05-11 NOTE — PROGRESS NOTES
Alvaro Sinclairsusan Louie presents today for   Chief Complaint   Patient presents with    Fatigue     Pt reports being fatigue and always feeling dizzy, pt reports it started in August but has gotten worse over time        Is someone accompanying this pt? no    Is the patient using any DME equipment during OV? no    Health Maintenance reviewed and discussed and ordered per Provider. Health Maintenance Due   Topic Date Due    COVID-19 Vaccine (1) Never done    Pneumococcal 0-64 years Vaccine (1 - PCV) 10/31/2003    Varicella vaccine (2 of 2 - 2-dose childhood series) 07/07/2004    HPV vaccine (1 - 2-dose series) Never done    HIV screen  Never done    Hepatitis C screen  Never done    DTaP/Tdap/Td vaccine (3 - Tdap) 10/31/2016    Pap smear  Never done   . Coordination of Care:  1. \"Have you been to the ER, urgent care clinic since your last visit? Hospitalized since your last visit? \" No    2. \"Have you seen or consulted any other health care providers outside of the [address] [place] [address] since your last visit? \" No    3. For patients aged 39-70: Has the patient had a colonoscopy? N/A based on age/sex    If the patient is female:    4. For patients aged 41-77: Has the patient had a mammogram within the past 2 years? N/A based on age/sex    5. For patients aged 21-65: Has the patient had a pap smear?  No

## 2023-05-11 NOTE — PROGRESS NOTES
History of Present Illness  Alvaro Cleveland is a 24 y.o. female who presents today for:    Chief Complaint   Patient presents with    Fatigue     Pt reports being fatigue and always feeling dizzy, pt reports it started in August but has gotten worse over time      Past Medical History  Past Medical History:   Diagnosis Date    Hypertension         Surgical History  History reviewed. No pertinent surgical history. Current Medications  Current Outpatient Medications   Medication Sig    escitalopram (LEXAPRO) 10 MG tablet Take 1 tablet by mouth once daily    amLODIPine (NORVASC) 5 MG tablet Take 1 tablet by mouth daily    ketorolac (TORADOL) 10 MG tablet Take 10 mg by mouth every 6 hours as needed (Patient not taking: Reported on 5/11/2023)     No current facility-administered medications for this visit. Allergies/Drug Reactions  No Known Allergies     Family History  History reviewed. No pertinent family history.      Social History  Social History     Tobacco Use    Smoking status: Every Day    Smokeless tobacco: Never    Tobacco comments:     Quit smoking: smokes 3 a day   Substance Use Topics    Alcohol use: Not Currently     Alcohol/week: 1.0 standard drink    Drug use: Never        Health Maintenance   Topic Date Due    COVID-19 Vaccine (1) Never done    Pneumococcal 0-64 years Vaccine (1 - PCV) 10/31/2003    Varicella vaccine (2 of 2 - 2-dose childhood series) 07/07/2004    HPV vaccine (1 - 2-dose series) Never done    HIV screen  Never done    Hepatitis C screen  Never done    DTaP/Tdap/Td vaccine (3 - Tdap) 10/31/2016    Pap smear  Never done    Flu vaccine (Season Ended) 08/01/2023    Depression Monitoring  08/26/2023    Hepatitis A vaccine  Aged Out    Hib vaccine  Aged Out    Meningococcal (ACWY) vaccine  Aged Out     Physical Exam  Vital signs:   Vitals:    05/11/23 1604   BP: (!) 146/95   Site: Left Upper Arm   Position: Sitting   Cuff Size: Large Adult   Pulse: (!) 123   Resp: 18   Temp:

## 2023-05-22 ENCOUNTER — TELEPHONE (OUTPATIENT)
Facility: CLINIC | Age: 28
End: 2023-05-22

## 2023-05-22 DIAGNOSIS — F41.1 GENERALIZED ANXIETY DISORDER: ICD-10-CM

## 2023-07-06 ENCOUNTER — OFFICE VISIT (OUTPATIENT)
Facility: CLINIC | Age: 28
End: 2023-07-06
Payer: COMMERCIAL

## 2023-07-06 ENCOUNTER — HOSPITAL ENCOUNTER (OUTPATIENT)
Age: 28
Discharge: HOME | End: 2023-07-06
Payer: COMMERCIAL

## 2023-07-06 VITALS
HEART RATE: 100 BPM | SYSTOLIC BLOOD PRESSURE: 132 MMHG | TEMPERATURE: 98.2 F | DIASTOLIC BLOOD PRESSURE: 84 MMHG | HEIGHT: 67 IN | WEIGHT: 293 LBS | RESPIRATION RATE: 18 BRPM | OXYGEN SATURATION: 98 % | BODY MASS INDEX: 45.99 KG/M2

## 2023-07-06 DIAGNOSIS — Z13.1 DIABETES MELLITUS SCREENING: ICD-10-CM

## 2023-07-06 DIAGNOSIS — I10 ESSENTIAL (PRIMARY) HYPERTENSION: ICD-10-CM

## 2023-07-06 DIAGNOSIS — R68.89 OTHER GENERAL SYMPTOMS AND SIGNS: ICD-10-CM

## 2023-07-06 DIAGNOSIS — Z12.4 CERVICAL CANCER SCREENING: Primary | ICD-10-CM

## 2023-07-06 DIAGNOSIS — Z13.220 SCREENING CHOLESTEROL LEVEL: ICD-10-CM

## 2023-07-06 LAB
ALBUMIN SERPL-MCNC: 3.4 G/DL (ref 3.4–5)
ALBUMIN/GLOB SERPL: 0.7 (ref 0.8–1.7)
ALP SERPL-CCNC: 91 U/L (ref 45–117)
ALT SERPL-CCNC: 26 U/L (ref 13–56)
ANION GAP SERPL CALC-SCNC: 9 MMOL/L (ref 3–18)
AST SERPL W P-5'-P-CCNC: 16 U/L (ref 10–38)
BASOPHILS # BLD: 0 K/UL (ref 0–0.1)
BASOPHILS NFR BLD: 0 % (ref 0–2)
BILIRUB SERPL-MCNC: 0.3 MG/DL (ref 0.2–1)
BUN SERPL-MCNC: 11 MG/DL (ref 7–18)
BUN/CREAT SERPL: 14 (ref 12–20)
CA-I BLD-MCNC: 8.7 MG/DL (ref 8.5–10.1)
CHLORIDE SERPL-SCNC: 105 MMOL/L (ref 100–111)
CO2 SERPL-SCNC: 24 MMOL/L (ref 21–32)
CREAT SERPL-MCNC: 0.8 MG/DL (ref 0.6–1.3)
DIFFERENTIAL METHOD BLD: ABNORMAL
EOSINOPHIL # BLD: 0.2 K/UL (ref 0–0.4)
EOSINOPHIL NFR BLD: 3 % (ref 0–5)
ERYTHROCYTE [DISTWIDTH] IN BLOOD BY AUTOMATED COUNT: 18.7 % (ref 11.6–14.5)
EST. AVERAGE GLUCOSE BLD GHB EST-MCNC: 120 MG/DL
GLOBULIN SER CALC-MCNC: 4.6 G/DL (ref 2–4)
GLUCOSE SERPL-MCNC: 91 MG/DL (ref 74–99)
HBA1C MFR BLD: 5.8 % (ref 4.2–5.6)
HCT VFR BLD AUTO: 29.8 % (ref 35–45)
HGB BLD-MCNC: 9.1 G/DL (ref 12–16)
IMM GRANULOCYTES # BLD AUTO: 0 K/UL (ref 0–0.04)
IMM GRANULOCYTES NFR BLD AUTO: 0 % (ref 0–0.5)
LYMPHOCYTES # BLD: 2.1 K/UL (ref 0.9–3.6)
LYMPHOCYTES NFR BLD: 29 % (ref 21–52)
MCH RBC QN AUTO: 21.4 PG (ref 24–34)
MCHC RBC AUTO-ENTMCNC: 30.5 G/DL (ref 31–37)
MCV RBC AUTO: 70 FL (ref 78–100)
MONOCYTES # BLD: 0.5 K/UL (ref 0.05–1.2)
MONOCYTES NFR BLD: 7 % (ref 3–10)
NEUTS SEG # BLD: 4.4 K/UL (ref 1.8–8)
NEUTS SEG NFR BLD: 61 % (ref 40–73)
NRBC # BLD: 0 K/UL (ref 0–0.01)
NRBC BLD-RTO: 0 PER 100 WBC
PLATELET # BLD AUTO: 530 K/UL (ref 135–420)
PMV BLD AUTO: 9.9 FL (ref 9.2–11.8)
POTASSIUM SERPL-SCNC: 3.5 MMOL/L (ref 3.5–5.5)
PROT SERPL-MCNC: 8 G/DL (ref 6.4–8.2)
RBC # BLD AUTO: 4.26 M/UL (ref 4.2–5.3)
SODIUM SERPL-SCNC: 138 MMOL/L (ref 136–145)
TSH SERPL DL<=0.05 MIU/L-ACNC: 1.63 UIU/ML (ref 0.36–3.74)
VIT B12 SERPL-MCNC: 541 PG/ML (ref 211–911)
WBC # BLD AUTO: 7.2 K/UL (ref 4.6–13.2)

## 2023-07-06 PROCEDURE — 3075F SYST BP GE 130 - 139MM HG: CPT | Performed by: NURSE PRACTITIONER

## 2023-07-06 PROCEDURE — 85025 COMPLETE CBC W/AUTO DIFF WBC: CPT

## 2023-07-06 PROCEDURE — 84443 ASSAY THYROID STIM HORMONE: CPT

## 2023-07-06 PROCEDURE — 36415 COLL VENOUS BLD VENIPUNCTURE: CPT

## 2023-07-06 PROCEDURE — 82607 VITAMIN B-12: CPT

## 2023-07-06 PROCEDURE — G8427 DOCREV CUR MEDS BY ELIG CLIN: HCPCS | Performed by: NURSE PRACTITIONER

## 2023-07-06 PROCEDURE — 99214 OFFICE O/P EST MOD 30 MIN: CPT | Performed by: NURSE PRACTITIONER

## 2023-07-06 PROCEDURE — 3079F DIAST BP 80-89 MM HG: CPT | Performed by: NURSE PRACTITIONER

## 2023-07-06 PROCEDURE — G8417 CALC BMI ABV UP PARAM F/U: HCPCS | Performed by: NURSE PRACTITIONER

## 2023-07-06 PROCEDURE — 83036 HEMOGLOBIN GLYCOSYLATED A1C: CPT

## 2023-07-06 PROCEDURE — 80053 COMPREHEN METABOLIC PANEL: CPT

## 2023-07-06 PROCEDURE — 80061 LIPID PANEL: CPT

## 2023-07-06 PROCEDURE — 4004F PT TOBACCO SCREEN RCVD TLK: CPT | Performed by: NURSE PRACTITIONER

## 2023-07-06 NOTE — PROGRESS NOTES
Alvaro Castaneda presents today for   Chief Complaint   Patient presents with    Follow-up     2m follow up. Pt reports she has been feeling dizzy since starting the new medication prescribed at last visit. Is someone accompanying this pt? yes    Is the patient using any DME equipment during OV? no    Health Maintenance reviewed and discussed and ordered per Provider. Health Maintenance Due   Topic Date Due    COVID-19 Vaccine (1) Never done    Pneumococcal 0-64 years Vaccine (1 - PCV) 10/31/2003    Varicella vaccine (2 of 2 - 2-dose childhood series) 07/07/2004    HPV vaccine (1 - 2-dose series) Never done    HIV screen  Never done    Hepatitis C screen  Never done    DTaP/Tdap/Td vaccine (3 - Tdap) 10/31/2016    Pap smear  Never done   . Coordination of Care:  1. \"Have you been to the ER, urgent care clinic since your last visit? Hospitalized since your last visit? \" No    2. \"Have you seen or consulted any other health care providers outside of the [address] [place] [address] since your last visit? \" No    3. For patients aged 43-73: Has the patient had a colonoscopy? N/A based on age/sex    If the patient is female:    4. For patients aged 43-66: Has the patient had a mammogram within the past 2 years? N/A based on age/sex    5. For patients aged 21-65: Has the patient had a pap smear?  No

## 2023-07-07 LAB
CHOLEST SERPL-MCNC: 207 MG/DL
HDLC SERPL-MCNC: 47 MG/DL (ref 40–60)
HDLC SERPL: 4.4 (ref 0–5)
LDLC SERPL CALC-MCNC: 144.2 MG/DL (ref 0–100)
LIPID PANEL: ABNORMAL
TRIGL SERPL-MCNC: 79 MG/DL
VLDLC SERPL CALC-MCNC: 15.8 MG/DL

## 2023-07-10 ENCOUNTER — PATIENT MESSAGE (OUTPATIENT)
Facility: CLINIC | Age: 28
End: 2023-07-10

## 2023-07-10 DIAGNOSIS — D50.9 MICROCYTIC HYPOCHROMIC ANEMIA: Primary | ICD-10-CM

## 2023-07-10 RX ORDER — FERROUS SULFATE 325(65) MG
325 TABLET ORAL 2 TIMES DAILY
Qty: 180 TABLET | Refills: 1 | Status: SHIPPED | OUTPATIENT
Start: 2023-07-10

## 2023-07-10 RX ORDER — ASCORBIC ACID 250 MG
250 TABLET ORAL 2 TIMES DAILY
Qty: 180 TABLET | Refills: 1 | Status: SHIPPED | OUTPATIENT
Start: 2023-07-10

## 2023-07-10 NOTE — TELEPHONE ENCOUNTER
From: Alvaro Friedman  To: Armin Harris  Sent: 7/10/2023 4:04 PM EDT  Subject: Blood test results    Was everything OK?

## 2023-07-10 NOTE — TELEPHONE ENCOUNTER
Spoke with patient via phone call on 7/10/2023 of laboratory results. Lab results indicative of microcytic hypochromic anemia. Patient reports she has been told on several occasions by other providers that she is anemic. Prescribed Ferrous sulfate 325 mg tablet twice daily and Ascorbic acid 250 mg tablet twice daily. Patient referred to Hematology for evaluation and treatment of anemia. Patient understood had no further questions at this time.

## 2023-08-03 ENCOUNTER — TELEPHONE (OUTPATIENT)
Facility: CLINIC | Age: 28
End: 2023-08-03

## 2023-08-03 NOTE — TELEPHONE ENCOUNTER
Patient calls to say that for the last 4 days she has been having chest pains after she takes the second dose of wellbutrin. She says that she is taking amlodipine, ferrous sulfate, and wellbutrin in the morning. She wonders if the combination of the medications could be causing the chest pains.   She can be reached at [phone]

## 2023-08-04 NOTE — TELEPHONE ENCOUNTER
Spoke with patient via phone call on 8/4/2023. Due to side effects advised patient to discontinue use of second dose of prescribed Wellbutrin 150 mg tablet twice daily. Patient understood had no further questions at this time.

## 2023-09-13 ENCOUNTER — OFFICE VISIT (OUTPATIENT)
Facility: CLINIC | Age: 28
End: 2023-09-13
Payer: COMMERCIAL

## 2023-09-13 VITALS
RESPIRATION RATE: 18 BRPM | DIASTOLIC BLOOD PRESSURE: 87 MMHG | TEMPERATURE: 98 F | OXYGEN SATURATION: 100 % | SYSTOLIC BLOOD PRESSURE: 149 MMHG | HEIGHT: 67 IN | WEIGHT: 293 LBS | HEART RATE: 127 BPM | BODY MASS INDEX: 45.99 KG/M2

## 2023-09-13 DIAGNOSIS — F41.9 ANXIETY: ICD-10-CM

## 2023-09-13 DIAGNOSIS — R35.1 NOCTURIA: ICD-10-CM

## 2023-09-13 DIAGNOSIS — R00.0 TACHYCARDIA: Primary | ICD-10-CM

## 2023-09-13 DIAGNOSIS — Z13.1 DIABETES MELLITUS SCREENING: ICD-10-CM

## 2023-09-13 LAB
BILIRUBIN, URINE, POC: NEGATIVE
BLOOD URINE, POC: NEGATIVE
GLUCOSE URINE, POC: NEGATIVE
HBA1C MFR BLD: 5.6 %
KETONES, URINE, POC: NEGATIVE
LEUKOCYTE ESTERASE, URINE, POC: NEGATIVE
NITRITE, URINE, POC: NEGATIVE
PH, URINE, POC: 6 (ref 4.6–8)
PROTEIN,URINE, POC: NORMAL
SPECIFIC GRAVITY, URINE, POC: 1.03 (ref 1–1.03)
URINALYSIS CLARITY, POC: CLEAR
URINALYSIS COLOR, POC: YELLOW
UROBILINOGEN, POC: NORMAL

## 2023-09-13 PROCEDURE — 83036 HEMOGLOBIN GLYCOSYLATED A1C: CPT | Performed by: NURSE PRACTITIONER

## 2023-09-13 PROCEDURE — 81003 URINALYSIS AUTO W/O SCOPE: CPT | Performed by: NURSE PRACTITIONER

## 2023-09-13 PROCEDURE — 3077F SYST BP >= 140 MM HG: CPT | Performed by: NURSE PRACTITIONER

## 2023-09-13 PROCEDURE — 3079F DIAST BP 80-89 MM HG: CPT | Performed by: NURSE PRACTITIONER

## 2023-09-13 PROCEDURE — 4004F PT TOBACCO SCREEN RCVD TLK: CPT | Performed by: NURSE PRACTITIONER

## 2023-09-13 PROCEDURE — 99214 OFFICE O/P EST MOD 30 MIN: CPT | Performed by: NURSE PRACTITIONER

## 2023-09-13 PROCEDURE — G8417 CALC BMI ABV UP PARAM F/U: HCPCS | Performed by: NURSE PRACTITIONER

## 2023-09-13 PROCEDURE — G8428 CUR MEDS NOT DOCUMENT: HCPCS | Performed by: NURSE PRACTITIONER

## 2023-09-13 RX ORDER — METOPROLOL SUCCINATE 25 MG/1
25 TABLET, EXTENDED RELEASE ORAL DAILY
Qty: 90 TABLET | Refills: 0 | Status: SHIPPED | OUTPATIENT
Start: 2023-09-13

## 2023-09-13 NOTE — PROGRESS NOTES
History of Present Illness  Alvaro Millard is a 24 y.o. female who presents today for:    Chief Complaint   Patient presents with    Follow-up     2m follow up. Pt reports frequent urination and lower abd pain for about a half a week. Past Medical History  Past Medical History:   Diagnosis Date    Hypertension         Surgical History  No past surgical history on file. Current Medications  Current Outpatient Medications   Medication Sig    ascorbic acid (V-R VITAMIN C) 250 MG tablet Take 1 tablet by mouth 2 times daily    ferrous sulfate (IRON 325) 325 (65 Fe) MG tablet Take 1 tablet by mouth 2 times daily    buPROPion (WELLBUTRIN SR) 150 MG extended release tablet Take 1 tablet by mouth 2 times daily    amLODIPine (NORVASC) 5 MG tablet Take 1 tablet by mouth daily    ketorolac (TORADOL) 10 MG tablet Take 10 mg by mouth every 6 hours as needed (Patient not taking: Reported on 5/11/2023)     No current facility-administered medications for this visit. Allergies/Drug Reactions  No Known Allergies     Family History  No family history on file.      Social History  Social History     Tobacco Use    Smoking status: Every Day    Smokeless tobacco: Never    Tobacco comments:     Quit smoking: smokes 3 a day   Substance Use Topics    Alcohol use: Not Currently     Alcohol/week: 1.0 standard drink of alcohol    Drug use: Never        Health Maintenance   Topic Date Due    Hepatitis B vaccine (1 of 3 - 3-dose series) Never done    COVID-19 Vaccine (1) Never done    Pneumococcal 0-64 years Vaccine (1 - PCV) 10/31/2003    Varicella vaccine (2 of 2 - 2-dose childhood series) 07/07/2004    HPV vaccine (1 - 2-dose series) Never done    HIV screen  Never done    Hepatitis C screen  Never done    DTaP/Tdap/Td vaccine (3 - Tdap) 10/31/2016    Pap smear  Never done    Flu vaccine (1) Never done    Depression Monitoring  05/11/2024    A1C test (Diabetic or Prediabetic)  07/06/2024    Hepatitis A vaccine  Aged

## 2023-09-13 NOTE — PROGRESS NOTES
Sinangriffinlawrence Aden Chris presents today for   Chief Complaint   Patient presents with    Follow-up     2m follow up. Pt reports frequent urination and lower abd pain for about a half a week. Is someone accompanying this pt? Yes,     Is the patient using any DME equipment during OV? no    Health Maintenance reviewed and discussed and ordered per Provider. Health Maintenance Due   Topic Date Due    Hepatitis B vaccine (1 of 3 - 3-dose series) Never done    COVID-19 Vaccine (1) Never done    Pneumococcal 0-64 years Vaccine (1 - PCV) 10/31/2003    Varicella vaccine (2 of 2 - 2-dose childhood series) 07/07/2004    HPV vaccine (1 - 2-dose series) Never done    HIV screen  Never done    Hepatitis C screen  Never done    DTaP/Tdap/Td vaccine (3 - Tdap) 10/31/2016    Pap smear  Never done    Flu vaccine (1) Never done   . Coordination of Care:  1. \"Have you been to the ER, urgent care clinic since your last visit? Hospitalized since your last visit? \" No    2. \"Have you seen or consulted any other health care providers outside of the [address] [place] [address] since your last visit? \" No    3. For patients aged 43-73: Has the patient had a colonoscopy? N/A based on age/sex    If the patient is female:    4. For patients aged 43-66: Has the patient had a mammogram within the past 2 years? N/A based on age/sex    5. For patients aged 21-65: Has the patient had a pap smear?  Yes- Rooming LPN/MA will get records

## 2023-09-23 NOTE — TELEPHONE ENCOUNTER
----- Message from Mindy Notice sent at 8/17/2022  3:56 PM EDT -----  Subject: Appointment Request    Reason for Call: Established [facility]    Reason for appointment request? No appointments available during search     Additional Information for Provider? Patient wants to see if could be seen   sooner for an appt to discuss anxiety issues going on and may need to go   on meds again.  She just took custody for her brothers and sisters and alot   going on. cb to schedule can't come in tomorrow but could after that.   ---------------------------------------------------------------------------  --------------  [id number] [employer]  [phone]; OK to leave message on voicemail  ---------------------------------------------------------------------------  --------------  SCRIPT ANSWERS  COVID Screen: [place] Goal Outcome Evaluation:       PTop portion must ALWAYS be completed  PRIMARY Concern: Aspiration pneumonia, UTI  SAFETY RISK Concerns (fall risk, behaviors, etc.): Fall risk, dysphagia  Tests/Procedures for NEXT shift: video swallow study  completed yesterday  Consults? (Pending/following, signed-off?) speech following, woc 9-22  Where is patient from? (Home, TCU, etc.[facility]  Other Important info for NEXT shift: na  Anticipated DC date & active delays: SUMMARY NOTE: 09/22/23 9279-4577            Orientation/Cognitive: A&O x4; can be forgetful at times.   Observation Goals (Met/ Not Met): Inpatient   Mobility Level/Assist Equipment: Repo q2h, Lift. Hx cva. Unable to move RUE. Minimal movement RLE  Antibiotics & Plan (IV/po, length of tx left): IV abx  Pain Management: Denies pain. Tylenol available.   Tele/VS/O2: VSS on 3L O2. 2 L baseline. Freq cough. Congested, unable to produce sputum, encouragement provided.  ABNL Lab/BG: vre. urine culture pending  Diet: minced moist, mildly thick, mod carb  Bowel/Bladder: Incontinent of bowel, Chirinos in place. Sediment present; chirinos draining well.    Skin Concerns: buttock, purple. Woc consult ordered. Pulsating mattress. Mapilex applied to affected area   Drains/Devices: Chirinos. dentures  Patient Stated Goal for Today: Go home

## 2023-10-20 ENCOUNTER — HOSPITAL ENCOUNTER (EMERGENCY)
Age: 28
Discharge: HOME | End: 2023-10-21
Attending: FAMILY MEDICINE
Payer: MEDICAID

## 2023-10-20 ENCOUNTER — APPOINTMENT (OUTPATIENT)
Age: 28
End: 2023-10-20
Payer: MEDICAID

## 2023-10-20 VITALS
OXYGEN SATURATION: 100 % | BODY MASS INDEX: 48.24 KG/M2 | RESPIRATION RATE: 18 BRPM | TEMPERATURE: 98.4 F | DIASTOLIC BLOOD PRESSURE: 93 MMHG | SYSTOLIC BLOOD PRESSURE: 134 MMHG | HEART RATE: 110 BPM | WEIGHT: 293 LBS

## 2023-10-20 DIAGNOSIS — S93.401A SPRAIN OF RIGHT ANKLE, UNSPECIFIED LIGAMENT, INITIAL ENCOUNTER: Primary | ICD-10-CM

## 2023-10-20 PROCEDURE — 73610 X-RAY EXAM OF ANKLE: CPT

## 2023-10-20 PROCEDURE — 99283 EMERGENCY DEPT VISIT LOW MDM: CPT

## 2023-10-21 PROCEDURE — 6370000000 HC RX 637 (ALT 250 FOR IP): Performed by: FAMILY MEDICINE

## 2023-10-21 RX ORDER — IBUPROFEN 800 MG/1
800 TABLET ORAL 3 TIMES DAILY PRN
Qty: 30 TABLET | Refills: 1 | Status: SHIPPED | OUTPATIENT
Start: 2023-10-21

## 2023-10-21 RX ORDER — IBUPROFEN 600 MG/1
600 TABLET ORAL
Status: COMPLETED | OUTPATIENT
Start: 2023-10-21 | End: 2023-10-21

## 2023-10-21 RX ADMIN — IBUPROFEN 600 MG: 600 TABLET, FILM COATED ORAL at 00:42

## 2023-10-21 ASSESSMENT — ENCOUNTER SYMPTOMS
GASTROINTESTINAL NEGATIVE: 1
RESPIRATORY NEGATIVE: 1

## 2023-10-21 NOTE — ED TRIAGE NOTES
Pt arrived via ems for R ankle pain. Pt states she was drinking and fell off the step. PT has r ankle pain, moderate swelling and bruising noted. Sensation wnl, pulses present.

## 2023-10-21 NOTE — ED PROVIDER NOTES
[facility] EMERGENCY DEPT  EMERGENCY DEPARTMENT ENCOUNTER      Pt Name: Cherlyn Soulier  MRN: [id number]  [address] [facility] [address] 1995  Date of evaluation: 10/20/2023  Provider: Onesimo Edmondson       Chief Complaint   Patient presents with    Ankle Injury         HISTORY OF PRESENT ILLNESS   (Location/Symptom, Timing/Onset, Context/Setting, Quality, Duration, Modifying Factors, Severity)  Note limiting factors. Alvaro Reese is a 24 y.o. female who presents to the emergency department right ankle pain     Patient presents to the ED via EMS for right ankle injury. She admits to ETOH use. She accidentally stepped wrong off the porch, resulting in her twisting her ankle. She is unsure the exact mechanism of injury. She was able to ambulate after the injury for about 50ft but then it became too painful. Pain is on the lateral aspect of the right ankle. She reports injuring this ankle in the past. Pain does not radiate. No numbness, tingling or bruising. Ambulation and palpation make the pain worse, rest makes it better. Patient told EMS en route she feels much better and was asking for discharge immediately upon arrival to the ED    The history is provided by the patient, the EMS personnel and medical records. Nursing Notes were reviewed. REVIEW OF SYSTEMS    (2-9 systems for level 4, 10 or more for level 5)     Review of Systems   Constitutional: Negative. Respiratory: Negative. Cardiovascular: Negative. Gastrointestinal: Negative. Musculoskeletal:  Positive for arthralgias, gait problem and joint swelling. Skin: Negative. Neurological:  Negative for dizziness, syncope and headaches. All other systems reviewed and are negative. Except as noted above the remainder of the review of systems was reviewed and negative. PAST MEDICAL HISTORY     Past Medical History:   Diagnosis Date    Hypertension          SURGICAL HISTORY     History reviewed.  No pertinent

## 2023-10-21 NOTE — DISCHARGE INSTRUCTIONS
Rest, ice, and elevate your ankle. Alternate Tylenol and Motrin for pain control. Apply ice for pain and swelling control. Follow up with your Primary Doctor. Return to the ED for new or worsening symptoms.

## 2023-12-27 ENCOUNTER — TELEPHONE (OUTPATIENT)
Facility: CLINIC | Age: 28
End: 2023-12-27

## 2023-12-27 DIAGNOSIS — R00.0 TACHYCARDIA: ICD-10-CM

## 2023-12-27 RX ORDER — METOPROLOL SUCCINATE 25 MG/1
25 TABLET, EXTENDED RELEASE ORAL DAILY
Qty: 90 TABLET | Refills: 1 | Status: SHIPPED | OUTPATIENT
Start: 2023-12-27

## 2023-12-27 NOTE — TELEPHONE ENCOUNTER
Patient said she missed a couple doses of her metoprolol, and she has been feeling dizzy, she wants to know if missing her medication be the reasoning?    [phone]

## 2024-03-12 DIAGNOSIS — F41.1 GENERALIZED ANXIETY DISORDER: ICD-10-CM

## 2024-03-12 RX ORDER — BUPROPION HYDROCHLORIDE 150 MG/1
150 TABLET, EXTENDED RELEASE ORAL 2 TIMES DAILY
Qty: 180 TABLET | Refills: 0 | Status: SHIPPED | OUTPATIENT
Start: 2024-03-12

## 2024-04-30 DIAGNOSIS — I10 ESSENTIAL (PRIMARY) HYPERTENSION: ICD-10-CM

## 2024-04-30 RX ORDER — AMLODIPINE BESYLATE 5 MG/1
5 TABLET ORAL DAILY
Qty: 90 TABLET | Refills: 0 | OUTPATIENT
Start: 2024-04-30

## 2024-05-02 ENCOUNTER — TELEPHONE (OUTPATIENT)
Facility: CLINIC | Age: 29
End: 2024-05-02

## 2024-05-02 RX ORDER — AMLODIPINE BESYLATE 5 MG/1
5 TABLET ORAL DAILY
Qty: 90 TABLET | Refills: 1 | Status: SHIPPED | OUTPATIENT
Start: 2024-05-02

## 2024-07-15 ENCOUNTER — OFFICE VISIT (OUTPATIENT)
Facility: CLINIC | Age: 29
End: 2024-07-15
Payer: MEDICAID

## 2024-07-15 VITALS
BODY MASS INDEX: 45.99 KG/M2 | SYSTOLIC BLOOD PRESSURE: 121 MMHG | TEMPERATURE: 97.9 F | RESPIRATION RATE: 18 BRPM | HEIGHT: 67 IN | DIASTOLIC BLOOD PRESSURE: 81 MMHG | OXYGEN SATURATION: 100 % | WEIGHT: 293 LBS | HEART RATE: 116 BPM

## 2024-07-15 DIAGNOSIS — R68.89 OTHER GENERAL SYMPTOMS AND SIGNS: ICD-10-CM

## 2024-07-15 DIAGNOSIS — D50.9 MICROCYTIC HYPOCHROMIC ANEMIA: Primary | ICD-10-CM

## 2024-07-15 DIAGNOSIS — I10 ESSENTIAL HYPERTENSION: ICD-10-CM

## 2024-07-15 DIAGNOSIS — R00.0 TACHYCARDIA: ICD-10-CM

## 2024-07-15 DIAGNOSIS — F33.1 MODERATE EPISODE OF RECURRENT MAJOR DEPRESSIVE DISORDER (HCC): ICD-10-CM

## 2024-07-15 DIAGNOSIS — Z13.220 SCREENING CHOLESTEROL LEVEL: ICD-10-CM

## 2024-07-15 DIAGNOSIS — F41.1 GENERALIZED ANXIETY DISORDER: ICD-10-CM

## 2024-07-15 DIAGNOSIS — N92.1 PROLONGED MENSTRUAL CYCLE: ICD-10-CM

## 2024-07-15 DIAGNOSIS — Z13.1 DIABETES MELLITUS SCREENING: ICD-10-CM

## 2024-07-15 PROCEDURE — 3079F DIAST BP 80-89 MM HG: CPT | Performed by: NURSE PRACTITIONER

## 2024-07-15 PROCEDURE — 99214 OFFICE O/P EST MOD 30 MIN: CPT | Performed by: NURSE PRACTITIONER

## 2024-07-15 PROCEDURE — 3074F SYST BP LT 130 MM HG: CPT | Performed by: NURSE PRACTITIONER

## 2024-07-15 RX ORDER — BUPROPION HYDROCHLORIDE 150 MG/1
150 TABLET ORAL EVERY MORNING
COMMUNITY

## 2024-07-15 SDOH — ECONOMIC STABILITY: INCOME INSECURITY: HOW HARD IS IT FOR YOU TO PAY FOR THE VERY BASICS LIKE FOOD, HOUSING, MEDICAL CARE, AND HEATING?: SOMEWHAT HARD

## 2024-07-15 SDOH — ECONOMIC STABILITY: FOOD INSECURITY: WITHIN THE PAST 12 MONTHS, THE FOOD YOU BOUGHT JUST DIDN'T LAST AND YOU DIDN'T HAVE MONEY TO GET MORE.: SOMETIMES TRUE

## 2024-07-15 SDOH — ECONOMIC STABILITY: FOOD INSECURITY: WITHIN THE PAST 12 MONTHS, YOU WORRIED THAT YOUR FOOD WOULD RUN OUT BEFORE YOU GOT MONEY TO BUY MORE.: SOMETIMES TRUE

## 2024-07-15 ASSESSMENT — PATIENT HEALTH QUESTIONNAIRE - PHQ9
1. LITTLE INTEREST OR PLEASURE IN DOING THINGS: NEARLY EVERY DAY
8. MOVING OR SPEAKING SO SLOWLY THAT OTHER PEOPLE COULD HAVE NOTICED. OR THE OPPOSITE, BEING SO FIGETY OR RESTLESS THAT YOU HAVE BEEN MOVING AROUND A LOT MORE THAN USUAL: NOT AT ALL
SUM OF ALL RESPONSES TO PHQ QUESTIONS 1-9: 21
5. POOR APPETITE OR OVEREATING: NEARLY EVERY DAY
3. TROUBLE FALLING OR STAYING ASLEEP: NEARLY EVERY DAY
7. TROUBLE CONCENTRATING ON THINGS, SUCH AS READING THE NEWSPAPER OR WATCHING TELEVISION: NEARLY EVERY DAY
9. THOUGHTS THAT YOU WOULD BE BETTER OFF DEAD, OR OF HURTING YOURSELF: NOT AT ALL
SUM OF ALL RESPONSES TO PHQ9 QUESTIONS 1 & 2: 6
6. FEELING BAD ABOUT YOURSELF - OR THAT YOU ARE A FAILURE OR HAVE LET YOURSELF OR YOUR FAMILY DOWN: NEARLY EVERY DAY
2. FEELING DOWN, DEPRESSED OR HOPELESS: NEARLY EVERY DAY
10. IF YOU CHECKED OFF ANY PROBLEMS, HOW DIFFICULT HAVE THESE PROBLEMS MADE IT FOR YOU TO DO YOUR WORK, TAKE CARE OF THINGS AT HOME, OR GET ALONG WITH OTHER PEOPLE: EXTREMELY DIFFICULT
SUM OF ALL RESPONSES TO PHQ QUESTIONS 1-9: 21
4. FEELING TIRED OR HAVING LITTLE ENERGY: NEARLY EVERY DAY

## 2024-07-15 ASSESSMENT — COLUMBIA-SUICIDE SEVERITY RATING SCALE - C-SSRS
1. WITHIN THE PAST MONTH, HAVE YOU WISHED YOU WERE DEAD OR WISHED YOU COULD GO TO SLEEP AND NOT WAKE UP?: NO
6. HAVE YOU EVER DONE ANYTHING, STARTED TO DO ANYTHING, OR PREPARED TO DO ANYTHING TO END YOUR LIFE?: NO
2. HAVE YOU ACTUALLY HAD ANY THOUGHTS OF KILLING YOURSELF?: NO

## 2024-07-15 NOTE — PROGRESS NOTES
Chief Complaint   Patient presents with    Menstrual Problem     Cycle has been on since 06/30/2024       \"Have you been to the ER, urgent care clinic since your last visit?  Hospitalized since your last visit?\"    NO    “Have you seen or consulted any other health care providers outside of [facility] since your last visit?”    NO

## 2024-07-15 NOTE — PROGRESS NOTES
History of Present Illness  Alvaro Jackson is a 28 y.o. female who presents today for:    Chief Complaint   Patient presents with    Menstrual Problem     Cycle has been on since 06/30/2024       Past Medical History  Past Medical History:   Diagnosis Date    ADHD (attention deficit hyperactivity disorder)     Depression     Hypertension     Irritable bowel syndrome     Obesity         Surgical History  No past surgical history on file.     Current Medications  Current Outpatient Medications   Medication Sig    amLODIPine (NORVASC) 5 MG tablet Take 1 tablet by mouth daily    buPROPion (WELLBUTRIN SR) 150 MG extended release tablet Take 1 tablet by mouth twice daily    metoprolol succinate (TOPROL XL) 25 MG extended release tablet Take 1 tablet by mouth once daily    ibuprofen (ADVIL;MOTRIN) 800 MG tablet Take 1 tablet by mouth 3 times daily as needed for Pain    metoprolol tartrate (LOPRESSOR) 25 MG tablet     ascorbic acid (V-R VITAMIN C) 250 MG tablet Take 1 tablet by mouth 2 times daily    ferrous sulfate (IRON 325) 325 (65 Fe) MG tablet Take 1 tablet by mouth 2 times daily     No current facility-administered medications for this visit.       Allergies/Drug Reactions  No Known Allergies     Family History  No family history on file.     Social History  Social History     Tobacco Use    Smoking status: Every Day     Types: Cigarettes    Smokeless tobacco: Never    Tobacco comments:     Quit smoking: smokes 3 a day   Substance Use Topics    Alcohol use: Yes     Alcohol/week: 4.0 standard drinks of alcohol     Types: 4 Shots of liquor per week     Comment: Occasionally    Drug use: Never        Health Maintenance   Topic Date Due    Hepatitis B vaccine (1 of 3 - 3-dose series) Never done    COVID-19 Vaccine (1) Never done    Pneumococcal 0-64 years Vaccine (1 of 2 - PCV) 10/31/2003    Polio vaccine (3 of 3 - 4-dose series) 01/29/2004    Varicella vaccine (2 of 2 - 2-dose childhood series) 07/07/2004    HPV

## 2024-07-15 NOTE — PATIENT INSTRUCTIONS
Website: [email or web address]-we-help/meals-[email or web address]-[email or web address]  Requirements can vary by program and areas served.   To apply:  Contact [facility] Services [facility]: [phone] (Mon -Fri 8:30 a.m. to 4:30 p.m.)  Coverage Area:  [facility], [facility] & [facility]  Website: [email or web address]  Contact [facility] On [phone] (Mon - Fri 8:00am to 5:30pm)  Coverage Areas: [facility], [facility], [facility], [facility].    [facility]  What they offer:  [employer] provides comprehensive services to the disadvantaged/low-income community, homebound seniors and homeless in [place], [facility], and [facility].  Phone Number: [phone]  [address] [place] [address]  Services:  Food pantry (Monday, Wednesday, Friday), Soup kitchen, Senior Grocery Delivery Program, Food Bank, hygiene essentials, aid with completing SNAP applications.   Website: https://www.[facility].org/services      [employer] [employer] [facility]  What they offer: Deliver a bag filled with food and personal care items to isolated elderly persons and conduct 1:1 visits  Website: https://www.[facility].org/  Visit the website and select “Contact” to send a message  [phone]    [facility] Healthy Food Pantry  What they offer: Produce and healthy food options to [place] and surrounding neighborhood in the [facility].   Contact: [phone]  Helpful Info: Open every Monday 9am-11am.                                             [facility] Transportation Resources*  (Call [employer]/[facility] if need more resources.)        [facility]  What they offer: [facility] [employer] offers fixed routes, medical transportation, and on-demand response transit for those age 60+.  Accepts

## 2024-07-16 ENCOUNTER — HOSPITAL ENCOUNTER (OUTPATIENT)
Age: 29
Discharge: HOME | End: 2024-07-19
Payer: MEDICAID

## 2024-07-16 DIAGNOSIS — Z13.220 SCREENING CHOLESTEROL LEVEL: ICD-10-CM

## 2024-07-16 DIAGNOSIS — R68.89 OTHER GENERAL SYMPTOMS AND SIGNS: ICD-10-CM

## 2024-07-16 DIAGNOSIS — Z13.1 DIABETES MELLITUS SCREENING: ICD-10-CM

## 2024-07-16 DIAGNOSIS — I10 ESSENTIAL HYPERTENSION: ICD-10-CM

## 2024-07-16 LAB
ALBUMIN SERPL-MCNC: 3.2 G/DL (ref 3.4–5)
ALBUMIN/GLOB SERPL: 0.6 (ref 0.8–1.7)
ALP SERPL-CCNC: 96 U/L (ref 45–117)
ALT SERPL-CCNC: 32 U/L (ref 13–56)
ANION GAP SERPL CALC-SCNC: 10 MMOL/L (ref 3–18)
AST SERPL W P-5'-P-CCNC: 19 U/L (ref 10–38)
BASOPHILS # BLD: 0 K/UL (ref 0–0.1)
BASOPHILS NFR BLD: 0 % (ref 0–2)
BILIRUB SERPL-MCNC: 0.2 MG/DL (ref 0.2–1)
BUN SERPL-MCNC: 12 MG/DL (ref 7–18)
BUN/CREAT SERPL: 14 (ref 12–20)
CA-I BLD-MCNC: 9.6 MG/DL (ref 8.5–10.1)
CHLORIDE SERPL-SCNC: 103 MMOL/L (ref 100–111)
CHOLEST SERPL-MCNC: 225 MG/DL
CO2 SERPL-SCNC: 24 MMOL/L (ref 21–32)
CREAT SERPL-MCNC: 0.88 MG/DL (ref 0.6–1.3)
DIFFERENTIAL METHOD BLD: ABNORMAL
EOSINOPHIL # BLD: 0.2 K/UL (ref 0–0.4)
EOSINOPHIL NFR BLD: 2 % (ref 0–5)
ERYTHROCYTE [DISTWIDTH] IN BLOOD BY AUTOMATED COUNT: 20.1 % (ref 11.6–14.5)
EST. AVERAGE GLUCOSE BLD GHB EST-MCNC: 114 MG/DL
GLOBULIN SER CALC-MCNC: 5.3 G/DL (ref 2–4)
GLUCOSE SERPL-MCNC: 105 MG/DL (ref 74–99)
HBA1C MFR BLD: 5.6 % (ref 4.2–5.6)
HCT VFR BLD AUTO: 27.1 % (ref 35–45)
HDLC SERPL-MCNC: 51 MG/DL (ref 40–60)
HDLC SERPL: 4.4 (ref 0–5)
HGB BLD-MCNC: 8 G/DL (ref 12–16)
IMM GRANULOCYTES # BLD AUTO: 0 K/UL (ref 0–0.04)
IMM GRANULOCYTES NFR BLD AUTO: 0 % (ref 0–0.5)
LDLC SERPL CALC-MCNC: 154.6 MG/DL (ref 0–100)
LIPID PANEL: ABNORMAL
LYMPHOCYTES # BLD: 2.4 K/UL (ref 0.9–3.6)
LYMPHOCYTES NFR BLD: 26 % (ref 21–52)
MCH RBC QN AUTO: 19.5 PG (ref 24–34)
MCHC RBC AUTO-ENTMCNC: 29.5 G/DL (ref 31–37)
MCV RBC AUTO: 65.9 FL (ref 78–100)
MONOCYTES # BLD: 0.6 K/UL (ref 0.05–1.2)
MONOCYTES NFR BLD: 6 % (ref 3–10)
NEUTS SEG # BLD: 6 K/UL (ref 1.8–8)
NEUTS SEG NFR BLD: 66 % (ref 40–73)
NRBC # BLD: 0 K/UL (ref 0–0.01)
NRBC BLD-RTO: 0 PER 100 WBC
PLATELET # BLD AUTO: 528 K/UL (ref 135–420)
PMV BLD AUTO: 9.9 FL (ref 9.2–11.8)
POTASSIUM SERPL-SCNC: 3.7 MMOL/L (ref 3.5–5.5)
PROT SERPL-MCNC: 8.5 G/DL (ref 6.4–8.2)
RBC # BLD AUTO: 4.11 M/UL (ref 4.2–5.3)
SODIUM SERPL-SCNC: 137 MMOL/L (ref 136–145)
TRIGL SERPL-MCNC: 97 MG/DL
TSH SERPL DL<=0.05 MIU/L-ACNC: 2.06 UIU/ML (ref 0.36–3.74)
VIT B12 SERPL-MCNC: 588 PG/ML (ref 211–911)
VLDLC SERPL CALC-MCNC: 19.4 MG/DL
WBC # BLD AUTO: 9.2 K/UL (ref 4.6–13.2)

## 2024-07-16 PROCEDURE — 80053 COMPREHEN METABOLIC PANEL: CPT

## 2024-07-16 PROCEDURE — 36415 COLL VENOUS BLD VENIPUNCTURE: CPT

## 2024-07-16 PROCEDURE — 80061 LIPID PANEL: CPT

## 2024-07-16 PROCEDURE — 85025 COMPLETE CBC W/AUTO DIFF WBC: CPT

## 2024-07-16 PROCEDURE — 82607 VITAMIN B-12: CPT

## 2024-07-16 PROCEDURE — 83036 HEMOGLOBIN GLYCOSYLATED A1C: CPT

## 2024-07-16 PROCEDURE — 84443 ASSAY THYROID STIM HORMONE: CPT

## 2024-07-29 PROBLEM — E30.1 PRECOCIOUS PUBERTY: Status: ACTIVE | Noted: 2024-07-29

## 2024-07-29 PROBLEM — M94.0 COSTOCHONDRITIS: Status: ACTIVE | Noted: 2024-07-29

## 2024-07-29 PROBLEM — E66.9 OBESITY: Status: ACTIVE | Noted: 2024-07-29

## 2024-07-29 PROBLEM — E27.0 PRECOCIOUS ADRENARCHE (HCC): Status: ACTIVE | Noted: 2024-07-29

## 2024-07-29 PROBLEM — R10.13 EPIGASTRIC PAIN: Status: ACTIVE | Noted: 2024-07-29

## 2024-07-29 PROBLEM — F90.9 ATTENTION DEFICIT HYPERACTIVITY DISORDER (ADHD): Status: ACTIVE | Noted: 2024-07-29

## 2024-07-29 PROBLEM — L70.9 ACNE: Status: ACTIVE | Noted: 2024-07-29

## 2024-07-29 PROBLEM — R03.0 FINDING OF ABOVE NORMAL BLOOD PRESSURE: Status: ACTIVE | Noted: 2024-07-29

## 2024-08-03 ENCOUNTER — TELEPHONE (OUTPATIENT)
Facility: CLINIC | Age: 29
End: 2024-08-03

## 2024-08-03 DIAGNOSIS — E78.01 FAMILIAL HYPERCHOLESTEROLEMIA: Primary | ICD-10-CM

## 2024-08-03 RX ORDER — ATORVASTATIN CALCIUM 20 MG/1
20 TABLET, FILM COATED ORAL DAILY
Qty: 90 TABLET | Refills: 1 | Status: SHIPPED | OUTPATIENT
Start: 2024-08-03

## 2024-08-03 NOTE — TELEPHONE ENCOUNTER
Spoke with patient via phone call on 8/3/2024.  Prescribed Atorvastatin 20 mg tablet daily.  Patient reports she will have upcoming appointment with Gynecology this month.  Patient reports she is taking her Ferrous sulfate as prescribed and has observed increase in her energy level.  She reports she misunderstood instructions for Metoprolol and discontinued medication after visit with this provider on 7/15/2024.  Advised patient to resume her Metoprolol 25 mg extended release tablet daily.  Patient understood and had no further questions at this time.  Patient will make follow-up appointment due to missed appointment with this provider upcoming.

## 2024-09-06 DIAGNOSIS — D50.9 MICROCYTIC HYPOCHROMIC ANEMIA: ICD-10-CM

## 2024-09-06 DIAGNOSIS — R00.0 TACHYCARDIA: ICD-10-CM

## 2024-09-06 RX ORDER — FERROUS SULFATE 325(65) MG
1 TABLET ORAL 2 TIMES DAILY
Qty: 180 TABLET | Refills: 0 | Status: SHIPPED | OUTPATIENT
Start: 2024-09-06

## 2024-09-06 RX ORDER — METOPROLOL SUCCINATE 25 MG/1
25 TABLET, EXTENDED RELEASE ORAL DAILY
Qty: 90 TABLET | Refills: 0 | Status: SHIPPED | OUTPATIENT
Start: 2024-09-06

## 2025-07-17 ENCOUNTER — HOSPITAL ENCOUNTER (EMERGENCY)
Age: 30
Discharge: HOME | End: 2025-07-17
Attending: EMERGENCY MEDICINE
Payer: MEDICAID

## 2025-07-17 VITALS
TEMPERATURE: 98.3 F | HEIGHT: 67 IN | BODY MASS INDEX: 45.99 KG/M2 | OXYGEN SATURATION: 100 % | HEART RATE: 89 BPM | RESPIRATION RATE: 18 BRPM | SYSTOLIC BLOOD PRESSURE: 147 MMHG | WEIGHT: 293 LBS | DIASTOLIC BLOOD PRESSURE: 99 MMHG

## 2025-07-17 DIAGNOSIS — I10 ESSENTIAL HYPERTENSION: ICD-10-CM

## 2025-07-17 DIAGNOSIS — R42 DIZZINESS: ICD-10-CM

## 2025-07-17 DIAGNOSIS — N93.8 DYSFUNCTIONAL UTERINE BLEEDING: Primary | ICD-10-CM

## 2025-07-17 DIAGNOSIS — E66.01 MORBID OBESITY WITH BMI OF 50.0-59.9, ADULT (HCC): ICD-10-CM

## 2025-07-17 DIAGNOSIS — D50.9 MICROCYTIC ANEMIA: ICD-10-CM

## 2025-07-17 DIAGNOSIS — R42 LIGHTHEADEDNESS: ICD-10-CM

## 2025-07-17 DIAGNOSIS — Z91.148 NONCOMPLIANCE WITH MEDICATION REGIMEN: ICD-10-CM

## 2025-07-17 DIAGNOSIS — R00.0 TACHYCARDIA: ICD-10-CM

## 2025-07-17 LAB
ALBUMIN SERPL-MCNC: 3 G/DL (ref 3.4–5)
ALBUMIN/GLOB SERPL: 0.6
ALP SERPL-CCNC: 90 U/L (ref 45–117)
ALT SERPL-CCNC: 28 U/L (ref 10–35)
ANION GAP SERPL CALC-SCNC: 10 MMOL/L
APPEARANCE UR: CLEAR
AST SERPL W P-5'-P-CCNC: 18 U/L (ref 10–38)
BACTERIA URNS QL MICRO: NORMAL /HPF
BASOPHILS # BLD: 0.09 K/UL (ref 0–0.1)
BASOPHILS NFR BLD: 1 % (ref 0–2)
BILIRUB SERPL-MCNC: <0.2 MG/DL (ref 0.2–1)
BILIRUB UR QL: NEGATIVE
BUN SERPL-MCNC: 12 MG/DL (ref 6–23)
BUN/CREAT SERPL: 16
CA-I BLD-MCNC: 9.1 MG/DL (ref 8.5–10.1)
CHLORIDE SERPL-SCNC: 103 MMOL/L (ref 98–107)
CO2 SERPL-SCNC: 24 MMOL/L (ref 21–32)
COLOR UR: YELLOW
CREAT SERPL-MCNC: 0.71 MG/DL (ref 0.6–1.3)
DIFFERENTIAL METHOD BLD: ABNORMAL
EOSINOPHIL # BLD: 0.28 K/UL (ref 0–0.4)
EOSINOPHIL NFR BLD: 3 % (ref 0–5)
EPITH CASTS URNS QL MICRO: NORMAL /LPF (ref 0–20)
ERYTHROCYTE [DISTWIDTH] IN BLOOD BY AUTOMATED COUNT: 21.6 % (ref 11.6–14.5)
GLOBULIN SER CALC-MCNC: 5 G/DL
GLUCOSE SERPL-MCNC: 91 MG/DL (ref 74–108)
GLUCOSE UR STRIP.AUTO-MCNC: NEGATIVE MG/DL
HCG SERPL QL: NEGATIVE
HCT VFR BLD AUTO: 33.8 % (ref 35–45)
HGB BLD-MCNC: 9.6 G/DL (ref 12–16)
HGB UR QL STRIP: ABNORMAL
IMM GRANULOCYTES # BLD AUTO: 0 K/UL
IMM GRANULOCYTES NFR BLD AUTO: 0 %
KETONES UR QL STRIP.AUTO: ABNORMAL MG/DL
LEUKOCYTE ESTERASE UR QL STRIP.AUTO: NEGATIVE
LYMPHOCYTES # BLD: 2.14 K/UL (ref 0.9–3.6)
LYMPHOCYTES NFR BLD: 23 % (ref 21–52)
MCH RBC QN AUTO: 19.4 PG (ref 24–34)
MCHC RBC AUTO-ENTMCNC: 28.4 G/DL (ref 31–37)
MCV RBC AUTO: 68.3 FL (ref 78–100)
MONOCYTES # BLD: 0.74 K/UL (ref 0.05–1.2)
MONOCYTES NFR BLD: 8 % (ref 3–10)
NEUTS SEG # BLD: 6.05 K/UL (ref 1.8–8)
NEUTS SEG NFR BLD: 65 % (ref 40–73)
NITRITE UR QL STRIP.AUTO: NEGATIVE
NRBC # BLD: 0 K/UL (ref 0–0.01)
NRBC BLD-RTO: 0 PER 100 WBC
PH UR STRIP: 6 (ref 5–8)
PLATELET # BLD AUTO: 495 K/UL (ref 135–420)
PMV BLD AUTO: 9.9 FL (ref 9.2–11.8)
POTASSIUM SERPL-SCNC: 3.7 MMOL/L (ref 3.5–5.5)
PROT SERPL-MCNC: 7.9 G/DL (ref 6.4–8.2)
PROT UR STRIP-MCNC: ABNORMAL MG/DL
RBC # BLD AUTO: 4.95 M/UL (ref 4.2–5.3)
RBC #/AREA URNS HPF: NORMAL /HPF (ref 0–2)
RBC MORPH BLD: ABNORMAL
SODIUM SERPL-SCNC: NEGATIVE MMOL/L (ref 136–145)
SP GR UR REFRACTOMETRY: 1.02 (ref 1–1.03)
TSH, 3RD GENERATION: 1.17 UIU/ML (ref 0.27–4.2)
UROBILINOGEN UR QL STRIP.AUTO: 0.2 EU/DL (ref 0.2–1)
WBC # BLD AUTO: 9.3 K/UL (ref 4.6–13.2)
WBC URNS QL MICRO: NORMAL /HPF (ref 0–4)

## 2025-07-17 PROCEDURE — 99284 EMERGENCY DEPT VISIT MOD MDM: CPT

## 2025-07-17 PROCEDURE — 81001 URINALYSIS AUTO W/SCOPE: CPT

## 2025-07-17 PROCEDURE — 80053 COMPREHEN METABOLIC PANEL: CPT

## 2025-07-17 PROCEDURE — 85025 COMPLETE CBC W/AUTO DIFF WBC: CPT

## 2025-07-17 PROCEDURE — 84443 ASSAY THYROID STIM HORMONE: CPT

## 2025-07-17 PROCEDURE — 96374 THER/PROPH/DIAG INJ IV PUSH: CPT

## 2025-07-17 PROCEDURE — 6360000002 HC RX W HCPCS: Performed by: EMERGENCY MEDICINE

## 2025-07-17 PROCEDURE — 84703 CHORIONIC GONADOTROPIN ASSAY: CPT

## 2025-07-17 PROCEDURE — 2580000003 HC RX 258: Performed by: EMERGENCY MEDICINE

## 2025-07-17 RX ORDER — METOPROLOL SUCCINATE 25 MG/1
25 TABLET, EXTENDED RELEASE ORAL DAILY
Qty: 90 TABLET | Refills: 0 | Status: SHIPPED | OUTPATIENT
Start: 2025-07-17

## 2025-07-17 RX ORDER — METOPROLOL TARTRATE 1 MG/ML
5 INJECTION, SOLUTION INTRAVENOUS
Status: COMPLETED | OUTPATIENT
Start: 2025-07-17 | End: 2025-07-17

## 2025-07-17 RX ORDER — AMLODIPINE BESYLATE 5 MG/1
5 TABLET ORAL DAILY
Qty: 90 TABLET | Refills: 0 | Status: SHIPPED | OUTPATIENT
Start: 2025-07-17

## 2025-07-17 RX ORDER — 0.9 % SODIUM CHLORIDE 0.9 %
1000 INTRAVENOUS SOLUTION INTRAVENOUS ONCE
Status: COMPLETED | OUTPATIENT
Start: 2025-07-17 | End: 2025-07-17

## 2025-07-17 RX ADMIN — METOPROLOL TARTRATE 5 MG: 5 INJECTION INTRAVENOUS at 18:04

## 2025-07-17 RX ADMIN — SODIUM CHLORIDE 1000 ML: 0.9 INJECTION, SOLUTION INTRAVENOUS at 18:02

## 2025-07-17 ASSESSMENT — LIFESTYLE VARIABLES
HOW OFTEN DO YOU HAVE A DRINK CONTAINING ALCOHOL: 2-3 TIMES A WEEK
HOW MANY STANDARD DRINKS CONTAINING ALCOHOL DO YOU HAVE ON A TYPICAL DAY: 3 OR 4

## 2025-07-17 ASSESSMENT — PAIN SCALES - GENERAL: PAINLEVEL_OUTOF10: 0

## 2025-07-17 ASSESSMENT — PAIN - FUNCTIONAL ASSESSMENT: PAIN_FUNCTIONAL_ASSESSMENT: NONE - DENIES PAIN

## 2025-07-17 NOTE — ED PROVIDER NOTES
Worried About Running Out of Food in the Last Year: Sometimes true     Ran Out of Food in the Last Year: Sometimes true   Transportation Needs: Unmet Transportation Needs (7/15/2024)    PRAPARE - Transportation     Lack of Transportation (Medical): Not on file     Lack of Transportation (Non-Medical): Yes   Physical Activity: Not on file   Stress: Not on file   Social Connections: Not on file   Intimate Partner Violence: Not on file   Depression: At risk (7/15/2024)    PHQ-2     PHQ-2 Score: 21   Housing Stability: Unknown (7/15/2024)    Housing Stability Vital Sign     Unable to Pay for Housing in the Last Year: Not on file     Number of Places Lived in the Last Year: Not on file     Unstable Housing in the Last Year: No   Interpersonal Safety: Not At Risk (10/20/2023)    Interpersonal Safety Domain Source: IP Abuse Screening     Physical abuse: Denies     Verbal abuse: Denies     Emotional abuse: Denies     Financial abuse: Denies     Sexual abuse: Denies   Utilities: Not on file       Review of Systems     Negative except as listed above in HPI.  General: Reports dizziness and lightheadedness.    Genitourinary: Reports heavy menstrual bleeding and passing clots. Denies knowledge of uterine fibroids.  Physical Exam     Vitals:    07/17/25 1641 07/17/25 1700 07/17/25 1800 07/17/25 1915   BP: (!) 157/113 (!) 156/100  (!) 149/100   Pulse: (!) 120 (!) 105  100   Resp: 18 18 18 18   Temp: 98.3 °F (36.8 °C)      SpO2: 98% 100%  100%   Weight: (!) 152 kg (335 lb)      Height: 1.702 m (5' 7\")          Constitutional: 29-year-old morbidly obese  female well nourished, well developed, appears stated age. Alert and oriented.  HEENT: Normal cephalic/atraumatic.  neck supple without meningismus. PERRLA, no conjunctival injection. EOM intact, sclera anicteric.  Pharynx clear without exudates.  Cardiovascular: RRR, S1/S2, no murmurs, rubs, or gallops.  Warm, well-perfused extremities  Respiratory: Clear to

## 2025-07-17 NOTE — ED NOTES
Bedside shift change report given to devonte (oncoming nurse) by 2 south  (offgoing nurse). Report included the following information Nurse Handoff Report.

## 2025-07-18 NOTE — ED NOTES
Received patient from handoff from the outgoing physician, Dr. Casper, we discussed the patient's presentation course of stay as well as potential disposition.  He did give her a dose of metoprolol as well as a liter of normal saline for some dizziness.  She has a history of dysfunctional uterine bleeding.  She is on day 4 of her menstrual cycle usually last approximately 8 days.  She has been taking 1 tablet of iron daily.  But does all the medicine she has been taking not taking birth control.  She has not not been taking her blood pressure medicines either.    I did go see the patient.  She states she is feeling much better.  She did ambulate she denied any dizziness with ambulating.  She is requesting a note for today as well as for tomorrow.  We did discuss her lab work including her microcytic anemia and her mixed RDW, her hemoglobin hematocrit appear to be an appropriate level at this time actually higher than her baseline.  She did receive metoprolol here.  I did renew her blood pressure medicines.  I will go ahead and discharge the patient at this time with close follow-up with OB/GYN as well as with her primary care doctor for additional testing she agrees to this plan.     Dominick Kemp, DO  07/17/25 2040

## 2025-07-18 NOTE — ED NOTES
Pt ambulated around unit unassisted with steady gait. Pt states she still has a light headed feeling that comes and goes. MD aware.

## 2025-07-18 NOTE — DISCHARGE INSTRUCTIONS
As we spoke, I believe this is a multifaceted problem first thing is that you are slightly dehydrated based upon the ketones in your urine, also the size your red blood cells are somewhat small, this usually leads me to believe that you are iron deficient.  My recommendation at this time is to increase your iron from 1 tablet daily to 1 tablet 3 times a day.  I would take some over-the-counter Colace 1 tablet with each iron tablet to avoid constipation.  You need to drink more water as well.  Eat more fibrous vegetables red meat such as dark chicken will also help.  Your RDW is elevated which leads me to believe there could be a mixed anemia such as iron, B12, and folic acid.  Need to have these retested by your primary care provider call and make an appointment to have these retested.  Recommendations are to follow-up with OB/GYN to get back onto birth control.  I have renewed your blood pressure medicines.  You may want to consider using a multivitamin until you are seen by your primary care provider

## 2025-08-09 ENCOUNTER — HOSPITAL ENCOUNTER (EMERGENCY)
Age: 30
Discharge: HOME | End: 2025-08-09
Attending: FAMILY MEDICINE
Payer: MEDICAID

## 2025-08-09 VITALS
DIASTOLIC BLOOD PRESSURE: 74 MMHG | WEIGHT: 293 LBS | RESPIRATION RATE: 16 BRPM | HEIGHT: 67 IN | SYSTOLIC BLOOD PRESSURE: 119 MMHG | BODY MASS INDEX: 45.99 KG/M2 | OXYGEN SATURATION: 100 % | HEART RATE: 92 BPM | TEMPERATURE: 98.2 F

## 2025-08-09 DIAGNOSIS — M62.830 PARASPINAL MUSCLE SPASM: Primary | ICD-10-CM

## 2025-08-09 DIAGNOSIS — E86.0 DEHYDRATION: ICD-10-CM

## 2025-08-09 LAB
APPEARANCE UR: CLEAR
BACTERIA URNS QL MICRO: ABNORMAL /HPF
BILIRUB UR QL: NEGATIVE
COLOR UR: YELLOW
EPITH CASTS URNS QL MICRO: ABNORMAL /LPF (ref 0–20)
GLUCOSE UR STRIP.AUTO-MCNC: NEGATIVE MG/DL
HCG UR QL: NEGATIVE
HGB UR QL STRIP: NEGATIVE
KETONES UR QL STRIP.AUTO: ABNORMAL MG/DL
LEUKOCYTE ESTERASE UR QL STRIP.AUTO: NEGATIVE
MUCOUS THREADS URNS QL MICRO: NEGATIVE /LPF
NITRITE UR QL STRIP.AUTO: NEGATIVE
PH UR STRIP: 6 (ref 5–8)
PROT UR STRIP-MCNC: ABNORMAL MG/DL
RBC #/AREA URNS HPF: ABNORMAL /HPF (ref 0–2)
SP GR UR REFRACTOMETRY: 1.03 (ref 1–1.03)
UROBILINOGEN UR QL STRIP.AUTO: 0.2 EU/DL (ref 0.2–1)
WBC URNS QL MICRO: ABNORMAL /HPF (ref 0–4)

## 2025-08-09 PROCEDURE — 99284 EMERGENCY DEPT VISIT MOD MDM: CPT

## 2025-08-09 PROCEDURE — 81001 URINALYSIS AUTO W/SCOPE: CPT

## 2025-08-09 PROCEDURE — 6370000000 HC RX 637 (ALT 250 FOR IP): Performed by: FAMILY MEDICINE

## 2025-08-09 PROCEDURE — 6360000002 HC RX W HCPCS: Performed by: FAMILY MEDICINE

## 2025-08-09 PROCEDURE — 96372 THER/PROPH/DIAG INJ SC/IM: CPT

## 2025-08-09 PROCEDURE — 81025 URINE PREGNANCY TEST: CPT

## 2025-08-09 RX ORDER — KETOROLAC TROMETHAMINE 30 MG/ML
30 INJECTION, SOLUTION INTRAMUSCULAR; INTRAVENOUS
Status: COMPLETED | OUTPATIENT
Start: 2025-08-09 | End: 2025-08-09

## 2025-08-09 RX ORDER — HYDROCODONE BITARTRATE AND ACETAMINOPHEN 5; 325 MG/1; MG/1
1 TABLET ORAL
Refills: 0 | Status: COMPLETED | OUTPATIENT
Start: 2025-08-09 | End: 2025-08-09

## 2025-08-09 RX ORDER — PREDNISONE 20 MG/1
20 TABLET ORAL DAILY
Qty: 5 TABLET | Refills: 0 | Status: SHIPPED | OUTPATIENT
Start: 2025-08-09 | End: 2025-08-14

## 2025-08-09 RX ORDER — METHOCARBAMOL 750 MG/1
750 TABLET, FILM COATED ORAL 3 TIMES DAILY
Qty: 10 TABLET | Refills: 0 | Status: SHIPPED | OUTPATIENT
Start: 2025-08-09 | End: 2025-08-13

## 2025-08-09 RX ADMIN — KETOROLAC TROMETHAMINE 30 MG: 30 INJECTION, SOLUTION INTRAMUSCULAR at 16:55

## 2025-08-09 RX ADMIN — HYDROCODONE BITARTRATE AND ACETAMINOPHEN 1 TABLET: 5; 325 TABLET ORAL at 18:03

## 2025-08-09 ASSESSMENT — PAIN - FUNCTIONAL ASSESSMENT
PAIN_FUNCTIONAL_ASSESSMENT: 0-10

## 2025-08-09 ASSESSMENT — PAIN DESCRIPTION - DESCRIPTORS
DESCRIPTORS: ACHING

## 2025-08-09 ASSESSMENT — PAIN SCALES - GENERAL
PAINLEVEL_OUTOF10: 9
PAINLEVEL_OUTOF10: 9
PAINLEVEL_OUTOF10: 4

## 2025-08-09 ASSESSMENT — PAIN DESCRIPTION - LOCATION
LOCATION: BACK

## 2025-08-09 ASSESSMENT — PAIN DESCRIPTION - ORIENTATION
ORIENTATION: LEFT;LOWER
ORIENTATION: LEFT
ORIENTATION: LEFT;LOWER